# Patient Record
Sex: FEMALE | Race: WHITE | URBAN - METROPOLITAN AREA
[De-identification: names, ages, dates, MRNs, and addresses within clinical notes are randomized per-mention and may not be internally consistent; named-entity substitution may affect disease eponyms.]

---

## 2017-01-02 DIAGNOSIS — Z98.1 S/P SPINAL FUSION: Primary | ICD-10-CM

## 2017-01-04 ENCOUNTER — OFFICE VISIT (OUTPATIENT)
Dept: ORTHOPEDICS | Facility: CLINIC | Age: 67
End: 2017-01-04

## 2017-01-04 VITALS — WEIGHT: 241 LBS | BODY MASS INDEX: 40.15 KG/M2 | HEIGHT: 65 IN

## 2017-01-04 DIAGNOSIS — M41.9 SCOLIOSIS OF THORACOLUMBAR SPINE, UNSPECIFIED SCOLIOSIS TYPE: ICD-10-CM

## 2017-01-04 DIAGNOSIS — Z98.1 S/P SPINAL FUSION: Primary | ICD-10-CM

## 2017-01-04 DIAGNOSIS — M99.22: ICD-10-CM

## 2017-01-04 ASSESSMENT — ENCOUNTER SYMPTOMS
BACK PAIN: 1
MYALGIAS: 0
JOINT SWELLING: 0
MUSCLE WEAKNESS: 0
NECK PAIN: 1
STIFFNESS: 0
ARTHRALGIAS: 0
MUSCLE CRAMPS: 0

## 2017-01-04 NOTE — Clinical Note
1/4/2017       RE: Julisa Champion  [address] [id number]     Dear Colleague,    Thank you for referring your patient, Julisa Champion, to the [facility] at [facility]. Please see a copy of my visit note below.        HISTORY OF PRESENT ILLNESS:  She returns now after a posterior spinal fusion for correction for scoliosis.  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  When I saw this radiographically at her previous followup visit, I told her I thought this needs to be fixed.  However, she did not want it fixed at that time.  I said okay, that I would not force her to do it, but that when she was unable to tolerate the pain anymore she should call Loraine and come back and we should plan on fixing it.  She called last week and said that she could not tolerate the pain anymore.  She is describing right upper extremity shooting pain whenever she does something working in front of her, even including feeding herself, and she says she is unable to tolerate this pain anymore.  New radiographs are obtained today which include AP and lateral scoli films and a lateral cervical-thoracic film.  These are compared to the prior radiographs and show no change in the position, but there is about a 50% subluxation of T1 on T2 with marked angular deformity.  Her kyphotic alignment between T1 and T2 is approximately 49 degrees, normal should be perhaps 0 or 5.  Neurologic exam shows no evidence of myelopathy at this point; however, she does have exquisite pain at this level exacerbated by activities, especially using her right arm.      ASSESSMENT:  Proximal junctional failure with T1 on T2 subluxation.      PLAN:  My recommendation is that we go in and take out the T1-T2 disk, reduce T1 on T2.  We may need to extend the instrumentation up to C7, but potentially we may be able to get it with just T1 instrumentation, then  taking out the disk, placing interbody devices and doing axial anna-to-anna connectors to this.  I would consider getting prepositioning baselines although I am not sure that would profoundly change what we would need to do and that we will need to do intraoperative spinal cord monitoring.  I would like to have Cell Saver and there is some urgency to getting this done.  I will ask one of my colleagues, such as either Yoni Short or Zulay Morocho, to give me a hand with this surgery.  The patient and her  are both in agreement with this plan.  We will go ahead and get a stat MRI today with artifact reduction to look at what the anatomy is going to give this, but it is pretty clear that we are going to need to move on this in the relatively near future.  She is in agreement with that plan.       Again, thank you for allowing me to participate in the care of your patient.      Sincerely,    Isauro Wolfe MD

## 2017-01-04 NOTE — PROGRESS NOTES
Answers for HPI/ROS submitted by the patient on 1/4/2017   General Symptoms: No  Skin Symptoms: No  HENT Symptoms: No  EYE SYMPTOMS: No  HEART SYMPTOMS: No  LUNG SYMPTOMS: No  INTESTINAL SYMPTOMS: No  URINARY SYMPTOMS: No  GYNECOLOGIC SYMPTOMS: No  BREAST SYMPTOMS: No  SKELETAL SYMPTOMS: Yes  BLOOD SYMPTOMS: No  NERVOUS SYSTEM SYMPTOMS: No  MENTAL HEALTH SYMPTOMS: No  Back pain: Yes  Muscle aches: No  Neck pain: Yes  Swollen joints: No  Joint pain: No  Bone pain: No  Muscle cramps: No  Muscle weakness: No  Joint stiffness: No  Bone fracture: No    HISTORY OF PRESENT ILLNESS:  She returns now after a posterior spinal fusion for correction for scoliosis.  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  When I saw this radiographically at her previous followup visit, I told her I thought this needs to be fixed.  However, she did not want it fixed at that time.  I said okay, that I would not force her to do it, but that when she was unable to tolerate the pain anymore she should call Loraine and come back and we should plan on fixing it.  She called last week and said that she could not tolerate the pain anymore.  She is describing right upper extremity shooting pain whenever she does something working in front of her, even including feeding herself, and she says she is unable to tolerate this pain anymore.  New radiographs are obtained today which include AP and lateral scoli films and a lateral cervical-thoracic film.  These are compared to the prior radiographs and show no change in the position, but there is about a 50% subluxation of T1 on T2 with marked angular deformity.  Her kyphotic alignment between T1 and T2 is approximately 49 degrees, normal should be perhaps 0 or 5.  Neurologic exam shows no evidence of myelopathy at this point; however, she does have exquisite pain at this level exacerbated by activities, especially using her right arm.      ASSESSMENT:  Proximal  junctional failure with T1 on T2 subluxation.      PLAN:  My recommendation is that we go in and take out the T1-T2 disk, reduce T1 on T2.  We may need to extend the instrumentation up to C7, but potentially we may be able to get it with just T1 instrumentation, then taking out the disk, placing interbody devices and doing axial anna-to-anna connectors to this.  I would consider getting prepositioning baselines although I am not sure that would profoundly change what we would need to do and that we will need to do intraoperative spinal cord monitoring.  I would like to have Cell Saver and there is some urgency to getting this done.  I will ask one of my colleagues, such as either Yoni Short or Zulay Morocho, to give me a hand with this surgery.  The patient and her  are both in agreement with this plan.  We will go ahead and get a stat MRI today with artifact reduction to look at what the anatomy is going to give this, but it is pretty clear that we are going to need to move on this in the relatively near future.  She is in agreement with that plan.

## 2017-01-04 NOTE — NURSING NOTE
"Reason For Visit:   Chief Complaint   Patient presents with     Surgical Followup     s/p spinal fusion 8/15/16       Primary MD: Dr. Jarrett Sanchez  Ref. MD: Dr. Laura Agee      Occupation retired RN.  Date of injury: none    Date of surgery: 8/15/16  Type of surgery: PROCEDURES:    1.  Posterior spinal fusion T2-S1.    2.  Pelvic fixation.    3.  Laminectomy L2, L3, L4.    4.  Bautista-Sanchez osteotomies L2-L3, L3-L4, L4-L5.    5.  Facet osteotomies T12-L1 and L1-L2.    5.  Transforaminal lumbar interbody fusion L4-L5, L3-L4.    6.  Insertion structural intervertebral device, L3-L4, L4-L5.    7.  Image-guided surgery.    8.  Dural repair.    9.  Injection of intrathecal substance.  .  Smoker: No      Ht 1.66 m (5' 5.35\")  Wt 109.317 kg (241 lb)  BMI 39.67 kg/m2    Pain Assessment  Patient Currently in Pain: Yes  0-10 Pain Scale: 8  Primary Pain Location: Back  Pain Orientation: Upper  Pain Descriptors: Stabbing, Burning  Alleviating Factors: Other (comment), Rest (avoiding use)  Aggravating Factors: Other (comment) (use of arms, jarring motion (bumpy car ride))    Oswestry (SILVA) Questionnaire    OSWESTRY DISABILITY INDEX 1/4/2017   SECTION 1-PAIN INTENSITY 2  The pain is moderate at the moment.   SECTION 2-PERSONAL CARE (WASHING,DRESSING,ETC.) 3  I need some help but manage most of my personal care.   SECTION 3-LIFTING 4  I can only lift very light weights.   SECTION 4-WALKING 2  Pain prevents me from walking more than a quarter of a mile.   SECTION 5-SITTING 1  I can sit in my favorite chair as long as I like   SECTION 6-STANDING 3  Pain prevents me from standing for more than half an hour.   SECTION 7-SLEEPING 2  Because of pain I have less than 6 hours sleep.   SECTION 8-SEX LIFE (IF APPLICABLE) Does not apply / No answer   SECTION 9-SOCIAL LIFE 2  Pain has no significant effect on my social life apart from limiting my more energetic interests e.g. sport, etc.   SECTION 10-TRAVELING 2  Pain is bad but I " am able to manage trips over two hours.   Oswestry Disability Index: Count 9   SILVA: Total Score = SUM (total for answered questions) 21   Computed Oswestry Score 46.66 (%)                      Numeric Rating Scale:  VAS Scores     VAS Survey 1/4/2017   What is your level of back pain during the last week: 8.0   What is your level of RIGHT leg pain during the last week: 1.0   What is your level of LEFT leg pain during the last week: 1.0   What is your level of neck pain during the last week: 9.0   What is your level of RIGHT arm pain during the last week: 0   What is your level of LEFT arm pain during the last week: 1.0                Promis 10 Assessment    PROMIS 10 1/4/2017   In general, would you say your health is: Very Good = 4   In general, would you say your quality of life is: Very good = 4   In general, how would you rate your physical health? Good = 3   In general, how would you rate your mental health, including your mood and your ability to think? Good = 3   In general, how would you rate your satisfaction with your social activities and relationships? Very good = 4   In general, please rate how well you carry out your usual social activities and roles Good = 3   To what extent are you able to carry out your everyday physical activities such as walking, climbing stairs, carrying groceries, or moving a chair? Moderately = 3   How often have you been bothered by emotional problems such as feeling anxious, depressed or irritable? Rarely = 4   How would you rate your fatigue on average? Mild = 4   How would you rate your pain on average?   0 = No Pain  to  10 = Worst Imaginable Pain 8   Global Physical Health Score : Raw Score 12 (SUM : G03 - G06 - G07 - G08)   Global Mentall Health Score : Raw Score 15 (SUM : G02 - G04 - G05 - G10)   Total (Physical + Mental Health Score) 27

## 2017-01-05 ENCOUNTER — TELEPHONE (OUTPATIENT)
Dept: ORTHOPEDICS | Facility: CLINIC | Age: 67
End: 2017-01-05

## 2017-01-05 NOTE — TELEPHONE ENCOUNTER
reviewed MRI Spine  Done yesterday after clinic appt. Which confirmed what he needed for surgery planning levels Extension of Fusion for Subluxation T1-2.  In [facility] approved & surgery date set  For Feb 6.   reviewed with .   stated pt. Should go to  [facility]. Immediately if develops  Any changes in her Gait or incontinence of bowel or bladder.   I called pt. With results &  ER instructions.   Pt. Stated understanding.  Call back prn.  V.O.R.B./Loraine Lizarraga RN.

## 2017-01-13 ENCOUNTER — ANESTHESIA EVENT (OUTPATIENT)
Dept: SURGERY | Facility: CLINIC | Age: 67
DRG: 457 | End: 2017-01-13
Payer: MEDICARE

## 2017-01-13 ENCOUNTER — ALLIED HEALTH/NURSE VISIT (OUTPATIENT)
Dept: SURGERY | Facility: CLINIC | Age: 67
End: 2017-01-13

## 2017-01-13 ENCOUNTER — OFFICE VISIT (OUTPATIENT)
Dept: SURGERY | Facility: CLINIC | Age: 67
End: 2017-01-13

## 2017-01-13 VITALS
OXYGEN SATURATION: 96 % | RESPIRATION RATE: 16 BRPM | HEART RATE: 86 BPM | WEIGHT: 238.6 LBS | SYSTOLIC BLOOD PRESSURE: 122 MMHG | BODY MASS INDEX: 39.75 KG/M2 | DIASTOLIC BLOOD PRESSURE: 74 MMHG | HEIGHT: 65 IN | TEMPERATURE: 98.7 F

## 2017-01-13 DIAGNOSIS — Z01.818 PREOP EXAMINATION: ICD-10-CM

## 2017-01-13 DIAGNOSIS — Z01.818 PREOP EXAMINATION: Primary | ICD-10-CM

## 2017-01-13 LAB
ALBUMIN UR-MCNC: NEGATIVE MG/DL
ANION GAP SERPL CALCULATED.3IONS-SCNC: 7 MMOL/L (ref 3–14)
APPEARANCE UR: ABNORMAL
APTT PPP: 28 SEC (ref 22–37)
BASOPHILS # BLD AUTO: 0 10E9/L (ref 0–0.2)
BASOPHILS NFR BLD AUTO: 0.5 %
BILIRUB UR QL STRIP: NEGATIVE
BUN SERPL-MCNC: 16 MG/DL (ref 7–30)
CALCIUM SERPL-MCNC: 8.8 MG/DL (ref 8.5–10.1)
CHLORIDE SERPL-SCNC: 105 MMOL/L (ref 94–109)
CO2 SERPL-SCNC: 28 MMOL/L (ref 20–32)
COLOR UR AUTO: YELLOW
CREAT SERPL-MCNC: 0.8 MG/DL (ref 0.52–1.04)
DIFFERENTIAL METHOD BLD: ABNORMAL
EOSINOPHIL # BLD AUTO: 0.4 10E9/L (ref 0–0.7)
EOSINOPHIL NFR BLD AUTO: 6.8 %
ERYTHROCYTE [DISTWIDTH] IN BLOOD BY AUTOMATED COUNT: 13.1 % (ref 10–15)
GFR SERPL CREATININE-BSD FRML MDRD: 72 ML/MIN/1.7M2
GLUCOSE SERPL-MCNC: 80 MG/DL (ref 70–99)
GLUCOSE UR STRIP-MCNC: NEGATIVE MG/DL
HCT VFR BLD AUTO: 37.5 % (ref 35–47)
HGB BLD-MCNC: 11.6 G/DL (ref 11.7–15.7)
HGB UR QL STRIP: ABNORMAL
HYALINE CASTS #/AREA URNS LPF: 1 /LPF (ref 0–2)
IMM GRANULOCYTES # BLD: 0 10E9/L (ref 0–0.4)
IMM GRANULOCYTES NFR BLD: 0.2 %
INR PPP: 1.02 (ref 0.86–1.14)
KETONES UR STRIP-MCNC: NEGATIVE MG/DL
LEUKOCYTE ESTERASE UR QL STRIP: ABNORMAL
LYMPHOCYTES # BLD AUTO: 1.7 10E9/L (ref 0.8–5.3)
LYMPHOCYTES NFR BLD AUTO: 30.1 %
MCH RBC QN AUTO: 28.9 PG (ref 26.5–33)
MCHC RBC AUTO-ENTMCNC: 30.9 G/DL (ref 31.5–36.5)
MCV RBC AUTO: 93 FL (ref 78–100)
MONOCYTES # BLD AUTO: 0.4 10E9/L (ref 0–1.3)
MONOCYTES NFR BLD AUTO: 6.5 %
MUCOUS THREADS #/AREA URNS LPF: PRESENT /LPF
NEUTROPHILS # BLD AUTO: 3.2 10E9/L (ref 1.6–8.3)
NEUTROPHILS NFR BLD AUTO: 55.9 %
NITRATE UR QL: NEGATIVE
NRBC # BLD AUTO: 0 10*3/UL
NRBC BLD AUTO-RTO: 0 /100
PH UR STRIP: 5 PH (ref 5–7)
PLATELET # BLD AUTO: 254 10E9/L (ref 150–450)
POTASSIUM SERPL-SCNC: 4.2 MMOL/L (ref 3.4–5.3)
RBC # BLD AUTO: 4.02 10E12/L (ref 3.8–5.2)
RBC #/AREA URNS AUTO: 16 /HPF (ref 0–2)
SODIUM SERPL-SCNC: 140 MMOL/L (ref 133–144)
SP GR UR STRIP: 1.02 (ref 1–1.03)
SQUAMOUS #/AREA URNS AUTO: 7 /HPF (ref 0–1)
URN SPEC COLLECT METH UR: ABNORMAL
UROBILINOGEN UR STRIP-MCNC: 0 MG/DL (ref 0–2)
WBC # BLD AUTO: 5.7 10E9/L (ref 4–11)
WBC #/AREA URNS AUTO: 10 /HPF (ref 0–2)

## 2017-01-13 PROCEDURE — 86870 RBC ANTIBODY IDENTIFICATION: CPT | Performed by: NURSE PRACTITIONER

## 2017-01-13 PROCEDURE — 87086 URINE CULTURE/COLONY COUNT: CPT | Performed by: NURSE PRACTITIONER

## 2017-01-13 NOTE — MR AVS SNAPSHOT
After Visit Summary   2017    Julisa Champion    MRN: [id number]           Patient Information     Date Of Birth          1950        Visit Information        Provider Department      2017 12:30 PM Rn, [facility]        Care Instructions    Preparing for Your Surgery      Name:  Julisa Champion   MRN:  [id number]   :  1950   Today's Date:  2017     Arriving for surgery:  Surgery date:  17  Surgery time:  1:00 PM  Arrival time:  10:30 AM  Please come to:     [facility] [address]  [address] [place]  [address]    - parking is available in front of [facility] from 5:15AM to 8:00PM. If you prefer, park your car in the Green Lot.    -Proceed to the 3rd floor, check in at the Adult Surgery Waiting Lounge. [phone]    If an escort is needed stop at the Information Desk in the lobby. Inform the information person that you are here for surgery. An escort to the Adult Surgery Waiting Lounge will be provided.        What can I eat or drink?  -  You may have solid food or milk products until 8 hours prior to your surgery.  -  You may have water, apple juice or 7up/Sprite until 2 hours prior to your surgery.    Which medicines can I take?  -  Do NOT take these medications in the morning, the day of surgery: Lisinopril-Hydrochlorothiazide      -  Please take these medications the day of surgery: Tylenol orTramadol if needed    How do I prepare myself?  -  Take two showers: one the night before surgery; and one the morning of surgery.         Use Scrubcare or Hibiclens to wash from neck down.  You may use your own shampoo and conditioner. No other hair products.   -  Do NOT use lotion, powder, deodorant, or antiperspirant the day of your surgery.  -  Do NOT wear any makeup, fingernail polish or jewelry.  -  Begin using Incentive Spirometer 1 week prior to surgery.  Use 4 times  per day, up to 5-10 breaths each time.  Bring Incentive Spirometer to hospital.  -Do not bring your own medications to the hospital, except for inhalers and eye drops.  -  Bring your ID and insurance card.    Questions or Concerns:  If you have questions or concerns, please call the  Preoperative Assessment Center, Monday-Friday 7AM-7PM:  [phone]            AFTER YOUR SURGERY  Breathing exercises   Breathing exercises help you recover faster. Take deep breaths and let the air out slowly. This will:     Help you wake up after surgery.    Help prevent complications like pneumonia.  Preventing complications will help you go home sooner.   We may give you a breathing device (incentive spirometer) to encourage you to breathe deeply.   Nausea and vomiting   You may feel sick to your stomach after surgery; if so, let your nurse know.    Pain control:  After surgery, you may have pain. Our goal is to help you manage your pain. Pain medicine will help you feel comfortable enough to do activities that will help you heal.  These activities may include breathing exercises, walking and physical therapy.   To help your health care team treat your pain we will ask: 1) If you have pain  2) where it is located 3) describe your pain in your words  Methods of pain control include medications given by mouth, vein or by nerve block for some surgeries.  We may give you a pain control pump that will:  1) Deliver the medicine through a tube placed in your vein  2) Control the amount of medicine you receive  3) Allow you to push a button to deliver a dose of pain medicine  Sequential Compression Device (SCD) or Pneumo Boots:  You may need to wear SCD S on your legs or feet. These are wraps connected to a machine that pumps in air and releases it. The repeated pumping helps prevent blood clots from forming.       Using an Incentive Spirometer  Soon after your surgery, a nurse or therapist will teach you breathing exercises. These keep  your lungs clear, strengthen your breathing muscles, and help prevent complications.  The exercises include doing a deep-breathing exercise using a device called an incentive spirometer.  To do these exercises, you will breathe in through your mouth and not your nose. The incentive spirometer only works correctly if you breathe in through your mouth.  Four steps to clear lungs     Deep breathing expands the lungs, aids circulation, and helps prevent pneumonia.   1. Exhale normally.    Relax and breathe out.  2. Place your lips tightly around the mouthpiece.    Make sure the device is upright and not tilted.  3. Inhale as much air as you can through the mouthpiece (don't breath through your nose).    Inhale slowly and deeply.    Hold your breath long enough to keep the balls or disk raised for at least 3 seconds.    If you re inhaling too quickly, your device may make a tone. If you hear this tone, inhale more slowly.  4. Repeat the exercise regularly.    Do this exercise every hour while you're awake, or as your health care provider instructs.    You will also be taught coughing exercises and be asked to do them regularly on your own.    0296-8232 [employer]. [address] [place] [address]. All rights reserved. This information is not intended as a substitute for professional medical care. Always follow your healthcare professional's instructions.              Follow-ups after your visit        Your next 10 appointments already scheduled     Jan 13, 2017  1:00 PM   (Arrive by 12:45 PM)   PAC EVALUATION with  Pac Kathryn 8   [facility] ([facility])    [address] [place] [id number] [phone]            Jan 13, 2017  2:10 PM   (Arrive by 1:55 PM)   PAC Anesthesia Consult with Olivia Pac Anesthesiologist   [facility] ([facility])    [address] [place] [address]  [facility] [id number] [phone]            Jan 13, 2017  2:30 PM   LAB with  LAB   [facility] ([facility])    [address] [place] [id number] [phone]           Patient must bring picture ID.  Patient should be prepared to give a urine specimen  Please do not eat 10-12 hours before your appointment if you are coming in fasting for labs on lipids, cholesterol, or glucose (sugar).  Pregnant women should follow their Care Team instructions. Water with medications is okay. Do not drink coffee or other fluids.   If you have concerns about taking  your medications, please ask at office or if scheduling via [employer], send a message by clicking on Secure Messaging, Message Your Care Team.            Feb 06, 2017   Procedure with Isauro Wolfe MD   [facility] [place], Same Day Surgery (--)    [address] [facility] [address]   [phone]            Mar 23, 2017 11:45 AM   (Arrive by 11:15 AM)   RETURN SPINE with Isauro Wolfe MD   [facility] ([facility])    [address] [place] [address]  [address] [facility] [address] [phone]            Apr 20, 2017 10:30 AM   (Arrive by 10:00 AM)   RETURN SPINE with Isauro Wolfe MD   [facility] ([facility])    [address] [place] [id number] [phone]              Who to contact     Please call your clinic at [phone] to:    Ask questions about your health    Make or cancel appointments    Discuss your medicines    Learn about your test results    Speak to your doctor   If you have compliments or concerns about an experience at your clinic, or if you wish to file a complaint, please contact [facility] Patient Relations at [phone] or email us at [email or web address]         Additional Information About Your Visit        [employer]  Information     [employer]ps gives you secure access to your electronic health record. If you see a primary care provider, you can also send messages to your care team and make appointments. If you have questions, please call your primary care clinic.  If you do not have a primary care provider, please call [phone] and they will assist you.      Edtrips is an electronic gateway that provides easy, online access to your medical records. With Ed[employer]ps, you can request a clinic appointment, read your test results, renew a prescription or communicate with your care team.     To access your existing account, please contact your [facility] or call [phone] for assistance.        Care EveryWhere ID     This is your Care EveryWhere ID. This could be used by other organizations to access your [place] medical records  [id number]         Blood Pressure from Last 3 Encounters:   08/27/16 142/69   06/29/16 122/81   06/17/16 99/62    Weight from Last 3 Encounters:   01/04/17 109.317 kg (241 lb)   11/03/16 108.41 kg (239 lb)   08/15/16 100 kg (220 lb 7.4 oz)              Today, you had the following     No orders found for display       Primary Care Provider    Md Other Clinic                Thank you!     Thank you for choosing [facility] [place] [facility]  for your care. Our goal is always to provide you with excellent care. Hearing back from our patients is one way we can continue to improve our services. Please take a few minutes to complete the written survey that you may receive in the mail after your visit with us. Thank you!             Your Updated Medication List - Protect others around you: Learn how to safely use, store and throw away your medicines at www.disposemymeds.org.          This list is accurate as of: 1/13/17 12:38 PM.  Always use your most recent med list.                   Brand Name Dispense Instructions for use    acetaminophen 500 MG tablet    TYLENOL      Take 2 tablets (1,000 mg) by mouth every 6 hours as needed for mild pain or fever       EFFEXOR PO      Take 225 mg by mouth every evening       lisinopril-hydrochlorothiazide 20-12.5 MG per tablet    PRINZIDE/ZESTORETIC    30 tablet    Take 1 tablet by mouth daily       methocarbamol 750 MG tablet    ROBAXIN    60 tablet    Take 1 tablet (750 mg) by mouth 4 times daily as needed for muscle spasms       nortriptyline 10 MG capsule    PAMELOR    30 capsule    Take 1 capsule (10 mg) by mouth At Bedtime       order for DME     1 each    Equipment being ordered: Walker Wheels () and Walker () Treatment Diagnosis: Gait instability       polyethylene glycol Packet    MIRALAX/GLYCOLAX    30 packet    Take 17 g by mouth daily as needed for constipation       senna-docusate 8.6-50 MG per tablet    SENOKOT-S;PERICOLACE    100 tablet    Take 1-2 tablets by mouth 2 times daily as needed for constipation       traMADol 50 MG tablet    ULTRAM    120 tablet    Take 1 tablet (50 mg) by mouth every 6 hours as needed for moderate pain

## 2017-01-13 NOTE — PATIENT INSTRUCTIONS
Preparing for Your Surgery      Name:  Julisa Champion   MRN:  [id number]   :  1950   Today's Date:  2017     Arriving for surgery:  Surgery date:  17  Surgery time:  1:00 PM  Arrival time:  10:30 AM  Please come to:     [facility] [address]  [address] [place]  [address]    - parking is available in front of [facility] from 5:15AM to 8:00PM. If you prefer, park your car in the Green Lot.    -Proceed to the 3rd floor, check in at the Adult Surgery Waiting Lounge. [phone]    If an escort is needed stop at the Information Desk in the lobby. Inform the information person that you are here for surgery. An escort to the Adult Surgery Waiting Lounge will be provided.        What can I eat or drink?  -  You may have solid food or milk products until 8 hours prior to your surgery.  -  You may have water, apple juice or 7up/Sprite until 2 hours prior to your surgery.    Which medicines can I take?  -  Do NOT take these medications in the morning, the day of surgery: Lisinopril-Hydrochlorothiazide      -  Please take these medications the day of surgery: Tylenol orTramadol if needed    How do I prepare myself?  -  Take two showers: one the night before surgery; and one the morning of surgery.         Use Scrubcare or Hibiclens to wash from neck down.  You may use your own shampoo and conditioner. No other hair products.   -  Do NOT use lotion, powder, deodorant, or antiperspirant the day of your surgery.  -  Do NOT wear any makeup, fingernail polish or jewelry.  -  Begin using Incentive Spirometer 1 week prior to surgery.  Use 4 times per day, up to 5-10 breaths each time.  Bring Incentive Spirometer to hospital.  -Do not bring your own medications to the hospital, except for inhalers and eye drops.  -  Bring your ID and insurance card.    Questions or Concerns:  If you have questions or concerns, please call the  Preoperative Assessment  [place], Monday-Friday 7AM-7PM:  [phone]            AFTER YOUR SURGERY  Breathing exercises   Breathing exercises help you recover faster. Take deep breaths and let the air out slowly. This will:     Help you wake up after surgery.    Help prevent complications like pneumonia.  Preventing complications will help you go home sooner.   We may give you a breathing device (incentive spirometer) to encourage you to breathe deeply.   Nausea and vomiting   You may feel sick to your stomach after surgery; if so, let your nurse know.    Pain control:  After surgery, you may have pain. Our goal is to help you manage your pain. Pain medicine will help you feel comfortable enough to do activities that will help you heal.  These activities may include breathing exercises, walking and physical therapy.   To help your health care team treat your pain we will ask: 1) If you have pain  2) where it is located 3) describe your pain in your words  Methods of pain control include medications given by mouth, vein or by nerve block for some surgeries.  We may give you a pain control pump that will:  1) Deliver the medicine through a tube placed in your vein  2) Control the amount of medicine you receive  3) Allow you to push a button to deliver a dose of pain medicine  Sequential Compression Device (SCD) or Pneumo Boots:  You may need to wear SCD S on your legs or feet. These are wraps connected to a machine that pumps in air and releases it. The repeated pumping helps prevent blood clots from forming.       Using an Incentive Spirometer  Soon after your surgery, a nurse or therapist will teach you breathing exercises. These keep your lungs clear, strengthen your breathing muscles, and help prevent complications.  The exercises include doing a deep-breathing exercise using a device called an incentive spirometer.  To do these exercises, you will breathe in through your mouth and not your nose. The incentive spirometer only works  correctly if you breathe in through your mouth.  Four steps to clear lungs     Deep breathing expands the lungs, aids circulation, and helps prevent pneumonia.   1. Exhale normally.    Relax and breathe out.  2. Place your lips tightly around the mouthpiece.    Make sure the device is upright and not tilted.  3. Inhale as much air as you can through the mouthpiece (don't breath through your nose).    Inhale slowly and deeply.    Hold your breath long enough to keep the balls or disk raised for at least 3 seconds.    If you re inhaling too quickly, your device may make a tone. If you hear this tone, inhale more slowly.  4. Repeat the exercise regularly.    Do this exercise every hour while you're awake, or as your health care provider instructs.    You will also be taught coughing exercises and be asked to do them regularly on your own.    [id number] [employer]. [address] [place] [address], [place] [address]. All rights reserved. This information is not intended as a substitute for professional medical care. Always follow your healthcare professional's instructions.

## 2017-01-13 NOTE — H&P
Pre-Operative H & P     CC:  Preoperative exam to assess for increased cardiopulmonary risk while undergoing surgery and anesthesia.    Date of Encounter: 1/13/2017  Primary Care Physician:  Md JEIMY Mariano  Julisa Champion is a 66 year old female who presents for pre-operative H & P in preparation for optical tracking system fusion posterior cervical two levels with Cindy Wolfe and Rashawn on 2/6/2016 at [facility].         Julisa Champion is a 66 year old female with hypertension, obesity, restless leg syndrome, constipation, upper back pain, and anxiety that has been having a complication of upper back pain since having a spinal fusion procedure on 8/15/2016 for scoliosis.  It was determined that she now has T1 and T2 subluxations and the above listed procedure has been recommended by Dr. Wolfe for treatment.        History is obtained from the patient.     Past Medical History  Past Medical History   Diagnosis Date     Hypertension      History of spinal fusion      for scoliosis     History of blood transfusion      Constipation      Back pain      Subluxation of thoracic vertebra      Obesity      Anxiety      Restless leg syndrome        Past Surgical History  Past Surgical History   Procedure Laterality Date     Gastric bypass  2002     Orthopedic surgery       bilateral knee replacements     Hysterectomy       [facility] BSO     Optical tracking system fusion posterior spine thoracic three+ levels N/A 8/15/2016     Procedure: OPTICAL TRACKING SYSTEM FUSION POSTERIOR SPINE THORACIC THREE+ LEVELS;  Surgeon: Isauro Wolfe MD;  Location: [facility] OR       Hx of Blood transfusions/reactions: +hx blood transfusion, no reactions    Hx of abnormal bleeding or anti-platelet use: none    Menstrual history: No LMP recorded. Patient has had a hysterectomy.    Steroid use in the last year: none    Personal or FH with difficulty with Anesthesia:  See note in plan regarding  post-op delirium.  No know family history of anesthesia complications.    Prior to Admission Medications  Current Outpatient Prescriptions   Medication Sig Dispense Refill     traMADol (ULTRAM) 50 MG tablet Take 1 tablet (50 mg) by mouth every 6 hours as needed for moderate pain 120 tablet 1     nortriptyline (PAMELOR) 10 MG capsule Take 1 capsule (10 mg) by mouth At Bedtime 30 capsule 0     lisinopril-hydrochlorothiazide (PRINZIDE,ZESTORETIC) 20-12.5 MG per tablet Take 1 tablet by mouth daily 30 tablet      polyethylene glycol (MIRALAX/GLYCOLAX) packet Take 17 g by mouth daily as needed for constipation 30 packet 1     senna-docusate (SENOKOT-S;PERICOLACE) 8.6-50 MG per tablet Take 1-2 tablets by mouth 2 times daily as needed for constipation 100 tablet 1     methocarbamol (ROBAXIN) 750 MG tablet Take 1 tablet (750 mg) by mouth 4 times daily as needed for muscle spasms 60 tablet 0     order for DME Equipment being ordered: Walker Wheels () and Walker ()  Treatment Diagnosis: Gait instability 1 each 0     acetaminophen (TYLENOL) 500 MG tablet Take 2 tablets (1,000 mg) by mouth every 6 hours as needed for mild pain or fever       Venlafaxine HCl (EFFEXOR PO) Take 225 mg by mouth every evening          Allergies  No Known Allergies    Social History  Social History     Social History     Marital Status:      Spouse Name: Christian     Number of Children: 3     Years of Education: N/A     Occupational History     retired RN      Social History Main Topics     Smoking status: Never Smoker      Smokeless tobacco: Not on file     Alcohol Use: 0.0 oz/week     0 Standard drinks or equivalent per week      Comment: one cocktail a month when out for dinner     Drug Use: No     Sexual Activity: Not on file     Other Topics Concern     Caffeine Concern Yes     1 can of soda a day      Special Diet No     Exercise No     not much due to pain     Social History Narrative       Family History  Family History   Problem  "Relation Age of Onset     Colon Cancer Father      Other Cancer Mother      unsure of primary, but mets to kidney and bone     Myocardial Infarction Father      Coronary Artery Disease Father      Chromosome Abnormality Daughter      trisomy 18         ROS/MED HX  The complete review of systems is negative other than noted in the HPI or here.     ENT/Pulmonary:     (+)CT risk factors snores loudly, obese, , . .    Neurologic:     (+)other neuro restless leg syndrome    Cardiovascular:     (+) hypertension-range: unknown  METS/Exercise Tolerance:  >4 METS   Hematologic:     (+) History of Transfusion no previous transfusion reaction -     (-) history of blood clots   Musculoskeletal: Comment: T1 and T2 subluxation,  Upper back pain        GI/Hepatic:     (+) Other GI/Hepatic constipation      Renal/Genitourinary:  - ROS Renal section negative       Endo:  - neg endo ROS   (+) Obesity      Psychiatric:     (+) psychiatric history anxiety      Infectious Disease:  - neg infectious disease ROS       Malignancy:      - no malignancy   Other:    (+) H/O Chronic Pain,               Temp: 98.7  F (37.1  C) Temp src: Oral BP: 122/74 mmHg Pulse: 86   Resp: 16 SpO2: 96 %         238 lbs 9.6 oz  5' 5\"   Body mass index is 39.71 kg/(m^2).       Physical Exam  Constitutional: Awake, alert, cooperative, no apparent distress, and appears stated age. obese  Eyes: Pupils equal, round and reactive to light, extra ocular muscles intact, sclera clear, conjunctiva normal.  HENT: Normocephalic, oral pharynx with moist mucus membranes, good dentition. No goiter appreciated.   Respiratory: Clear to auscultation bilaterally, no crackles or wheezing.  Cardiovascular: Regular rate and rhythm, normal S1 and S2, and no murmur noted.  Carotids +2, no bruits. No edema. Palpable pulses to radial  DP and PT arteries.   GI: Normal bowel sounds, soft, non-distended, non-tender, no masses palpated, no hepatosplenomegaly.  Surgical scars: midline " surgical scar  Lymph/Hematologic: No cervical lymphadenopathy and no supraclavicular lymphadenopathy.  Genitourinary:  deferred  Skin: Warm and dry.  No rashes at anticipated surgical site.  Midline thoracic surgical scarring.  Musculoskeletal: Slightly limited ROM of neck. There is no redness, warmth, or swelling of the exposed joints. Gross motor strength is normal.    Neurologic: Awake, alert, oriented to name, place and time. Cranial nerves II-XII are grossly intact. Gait is normal.   Neuropsychiatric: Calm, cooperative. Normal affect.     Labs: (personally reviewed)  Component      Latest Ref Rng 1/13/2017   WBC      4.0 - 11.0 10e9/L 5.7   RBC Count      3.8 - 5.2 10e12/L 4.02   Hemoglobin      11.7 - 15.7 g/dL 11.6 (L)   Hematocrit      35.0 - 47.0 % 37.5   MCV      78 - 100 fl 93   MCH      26.5 - 33.0 pg 28.9   MCHC      31.5 - 36.5 g/dL 30.9 (L)   RDW      10.0 - 15.0 % 13.1   Platelet Count      150 - 450 10e9/L 254   Diff Method       Automated Method   % Neutrophils       55.9   % Lymphocytes       30.1   % Monocytes       6.5   % Eosinophils       6.8   % Basophils       0.5   % Immature Granulocytes       0.2   Nucleated RBCs      0 /100 0   Absolute Neutrophil      1.6 - 8.3 10e9/L 3.2   Absolute Lymphocytes      0.8 - 5.3 10e9/L 1.7   Absolute Monocytes      0.0 - 1.3 10e9/L 0.4   Absolute Eosinophils      0.0 - 0.7 10e9/L 0.4   Absolute Basophils      0.0 - 0.2 10e9/L 0.0   Abs Immature Granulocytes      0 - 0.4 10e9/L 0.0   Absolute Nucleated RBC       0.0   Sodium      133 - 144 mmol/L 140   Potassium      3.4 - 5.3 mmol/L 4.2   Chloride      94 - 109 mmol/L 105   Carbon Dioxide      20 - 32 mmol/L 28   Anion Gap      3 - 14 mmol/L 7   Glucose      70 - 99 mg/dL 80   Urea Nitrogen      7 - 30 mg/dL 16   Creatinine      0.52 - 1.04 mg/dL 0.80   GFR Estimate      >60 mL/min/1.7m2 72   GFR Estimate If Black      >60 mL/min/1.7m2 87   Calcium      8.5 - 10.1 mg/dL 8.8   PTT      22 - 37 sec 28    INR      0.86 - 1.14 1.02       EK2016  NSR  Cardiac echo: 2016  Interpretation Summary  No significant valvular abnormalities were noted. No pathologic basis for  murmur identified. Technically difficult study.Extremely poor acoustic  windows.  Global and regional left ventricular function is hyperkinetic with an EF of  65-70%.  Right ventricular function, chamber size, wall motion, and thickness are  normal.        ASSESSMENT and PLAN  Julisa Champion is a 66 year old female scheduled for optical tracking system fusion posterior cervical two levels on 2017 by Tony Wolfe and Rashawn in treatment of T1 and T2 subluxation.  PAC referral for risk assessment and optimization for anesthesia with comorbid conditions of:  Hypertension, obesity, restless leg syndrome, constipation, upper back pain, and anxiety.      Pre-operative considerations:  1.  Cardiac:  Functional status- METS 4.  Her exercise tolerance has been gradually improving since her previous spinal fusion surgery.  She can now walk at least 3 blocks without having to stop to rest.  Hypertension is well controlled with lisinopril/hctz which she will hold the DOS.  Intermediate risk surgery with 0.4% risk of major adverse cardiac event. No further cardiac evaluation needed per 2014 ACC/AHA guidelines for non-cardiac surgery.  2.  Pulm:  Airway feasible.  CT risk: intermediate.    3.  GI:  Risk of PONV score = 2.  If > 2, anti-emetic intervention recommended.  Patient reports having difficulty with constipation.  Consider continuing home dosages of miralax and senna for prevention.  4. Neuro:  Patient reports significant post-op delirium, confusion, and hallucinations after her last spinal fusion surgery this past August.  She reports she doesn't even remember the first 7 days of that hospitalization.  Will be high risk for post-op delirium.  Consider avoiding benzos as able and limiting narcotics.  Patient has RLS that is controlled with  nortriptyline.  Consider continuing this medication post-op to avoid symptoms of RLS while admitted.    5.  Musculoskeletal:  Uses a walker for ambulating longer distances, but not in the home.  May want to consider walker use while hospitalized and fall risk precautions.     VTE risk:  0.5%    Patient is optimized and is acceptable candidate for the proposed procedure.  No further diagnostic evaluation is needed.     Patient also evaluated by Dr. Youssef.           Brigid White DNP, RN, APRN  Preoperative Assessment Center  [facility]  Clinic and [facility] Center  Phone: [phone]  Fax: [phone]

## 2017-01-13 NOTE — ANESTHESIA PREPROCEDURE EVALUATION
Anesthesia Evaluation     . Pt has had prior anesthetic. Type: General    History of anesthetic complications  -     ROS/MED HX    ENT/Pulmonary:     (+)CT risk factors snores loudly, obese, , . .   (-) recent URI   Neurologic:     (+)other neuro restless leg syndrome , T1 on T2  sublaxation    Cardiovascular:     (+) hypertension-range: unknown, ---. : . . . :. . Previous cardiac testing Echodate:8/26/2016results:Interpretation Summary  No significant valvular abnormalities were noted. No pathologic basis for  murmur identified. Technically difficult study.Extremely poor acoustic  windows.  Global and regional left ventricular function is hyperkinetic with an EF of  65-70%.  Right ventricular function, chamber size, wall motion, and thickness are  normal.date: results:ECG reviewed date:8/25/2016 results:NSR date: results:          METS/Exercise Tolerance:  >4 METS   Hematologic:     (+) History of Transfusion no previous transfusion reaction -     (-) history of blood clots   Musculoskeletal: Comment: T1 and T2 subluxation,  Upper back pain        GI/Hepatic:     (+) Other GI/Hepatic constipation      Renal/Genitourinary:  - ROS Renal section negative       Endo:  - neg endo ROS   (+) Obesity, .      Psychiatric:     (+) psychiatric history anxiety      Infectious Disease:  - neg infectious disease ROS       Malignancy:      - no malignancy   Other:    (+) H/O Chronic Pain,             Physical Exam  Normal systems: cardiovascular and pulmonary    Airway   Mallampati: II  TM distance: >3 FB  Neck ROM: limited    Dental   Comment: Upper and lower bridges    Cardiovascular   Rhythm and rate: regular and normal      Pulmonary    breath sounds clear to auscultation    Other findings: obese           PAC Discussion and Assessment    ASA Classification: 2  Case is [facility] for: [facility]  Anesthetic techniques and relevant risks discussed: GA  Invasive monitoring and risk discussed: No  Types:   Possibility and Risk  of blood transfusion discussed: Yes  NPO instructions given:   Additional anesthetic preparation and risks discussed:   Needs early admission to pre-op area:   Other:     PAC Resident/NP Anesthesia Assessment:  Julisa Champion is a 66 year old female scheduled for optical tracking system fusion posterior cervical two levels on 2/6/2017 by Tony Wolfe and Rashawn in treatment of T1 and T2 subluxation.  PAC referral for risk assessment and optimization for anesthesia with comorbid conditions of:  Hypertension, obesity, restless leg syndrome, constipation, upper back pain, and anxiety.      Pre-operative considerations:  1.  Cardiac:  Functional status- METS 4.  Her exercise tolerance has been gradually improving since her previous spinal fusion surgery.  She can now walk at least 3 blocks without having to stop to rest.  Hypertension is well controlled with lisinopril/hctz which she will hold the DOS.  Intermediate risk surgery with 0.4% risk of major adverse cardiac event. No further cardiac evaluation needed per 2014 ACC/AHA guidelines for non-cardiac surgery.  2.  Pulm:  Airway feasible.  CT risk: intermediate.    3.  GI:  Risk of PONV score = 2.  If > 2, anti-emetic intervention recommended.  Patient reports having difficulty with constipation.  Consider continuing home dosages of miralax and senna for prevention.  4. Neuro:  Patient reports significant post-op delirium, confusion, and hallucinations after her last spinal fusion surgery this past August.  She reports she doesn't even remember the first 7 days of that hospitalization.  Will be high risk for post-op delirium.  Consider avoiding benzos as able and limiting narcotics.  Patient has RLS that is controlled with nortriptyline.  Consider continuing this medication post-op to avoid symptoms of RLS while admitted.    5.  Musculoskeletal:  Uses a walker for ambulating longer distances, but not in the home.  May want to consider walker use while hospitalized  and fall risk precautions.       VTE risk:  0.5%    Patient is optimized and is acceptable candidate for the proposed procedure.  No further diagnostic evaluation is needed.     Patient also evaluated by Dr. Youssef. See recommendations below.     For further details of assessment, testing, and physical exam please see H and P completed on same date.          Brigid White DNP, RN, APRN        Reviewed and Signed by PAC Mid-Level Provider/Resident  Mid-Level Provider/Resident: Brigid White DNP, RN, APRN  Date: 1/13/2017  Time: 1541    Attending Anesthesiologist Anesthesia Assessment:  STAFF:  66 y.o. woman with thoracic subluxation disease for additional spinal hardware by Dr. Wolfe  using general anesthesia.   History summarized above.  Surgery last summer was 14 hrs, and complicated by significant Post-OP cognitive dysfunction, bordering on delirium.   Airway is feasible and neck with FROM. Usual labs  Instructions given and questions answered.   Final plans by anesthesiology team on DOS.   ---rcp      Reviewed and Signed by PAC Anesthesiologist  Anesthesiologist: rcp  Date: 1/13/2017  Time:   Pass/Fail: Pass  Disposition:     PAC Pharmacist Assessment:        Pharmacist:   Date:   Time:      Anesthesia Plan      History & Physical Review  History and physical reviewed and following examination; no interval change.    ASA Status:  3 .    NPO Status:  > 8 hours    Plan for General with Intravenous and Propofol induction. Maintenance will be Balanced.    PONV prophylaxis:  Ondansetron (or other 5HT-3) and Dexamethasone or Solumedrol  Additional equipment: 2nd IV and Arterial Line (arterial line per surgeon request)      Postoperative Care  Postoperative pain management:  IV analgesics.      Consents  Anesthetic plan, risks, benefits and alternatives discussed with:  Patient and Spouse.  Use of blood products discussed: Yes.   Use of blood products discussed with Patient.  Consented to blood products.  .                           .

## 2017-01-14 LAB
BACTERIA SPEC CULT: NORMAL
Lab: NORMAL
MICRO REPORT STATUS: NORMAL
SPECIMEN SOURCE: NORMAL

## 2017-01-17 DIAGNOSIS — M99.12: Primary | ICD-10-CM

## 2017-01-17 LAB
ABO + RH BLD: ABNORMAL
ABO + RH BLD: ABNORMAL
BLD GP AB INVEST PLASRBC-IMP: ABNORMAL
BLD GP AB SCN SERPL QL: ABNORMAL
BLOOD BANK CMNT PATIENT-IMP: ABNORMAL
BLOOD BANK CMNT PATIENT-IMP: ABNORMAL
SPECIMEN EXP DATE BLD: ABNORMAL

## 2017-02-05 ENCOUNTER — APPOINTMENT (OUTPATIENT)
Dept: LAB | Facility: CLINIC | Age: 67
End: 2017-02-05
Attending: ORTHOPAEDIC SURGERY
Payer: MEDICARE

## 2017-02-05 DIAGNOSIS — M99.12: ICD-10-CM

## 2017-02-05 LAB
ABO + RH BLD: NORMAL
ABO + RH BLD: NORMAL
BLD GP AB SCN SERPL QL: NORMAL
BLOOD BANK CMNT PATIENT-IMP: NORMAL
SPECIMEN EXP DATE BLD: NORMAL

## 2017-02-06 ENCOUNTER — ANESTHESIA (OUTPATIENT)
Dept: SURGERY | Facility: CLINIC | Age: 67
DRG: 457 | End: 2017-02-06
Payer: MEDICARE

## 2017-02-06 ENCOUNTER — HOSPITAL ENCOUNTER (INPATIENT)
Facility: CLINIC | Age: 67
LOS: 4 days | Discharge: HOME HEALTH | DRG: 457 | End: 2017-02-10
Attending: ORTHOPAEDIC SURGERY | Admitting: ORTHOPAEDIC SURGERY
Payer: MEDICARE

## 2017-02-06 ENCOUNTER — APPOINTMENT (OUTPATIENT)
Dept: GENERAL RADIOLOGY | Facility: CLINIC | Age: 67
DRG: 457 | End: 2017-02-06
Attending: ORTHOPAEDIC SURGERY
Payer: MEDICARE

## 2017-02-06 DIAGNOSIS — J18.9 PNEUMONIA OF RIGHT LOWER LOBE DUE TO INFECTIOUS ORGANISM: ICD-10-CM

## 2017-02-06 DIAGNOSIS — Z98.1 S/P SPINAL FUSION: Primary | ICD-10-CM

## 2017-02-06 DIAGNOSIS — G25.81 RESTLESS LEG SYNDROME: ICD-10-CM

## 2017-02-06 DIAGNOSIS — M41.55 OTHER SECONDARY SCOLIOSIS, THORACOLUMBAR REGION: ICD-10-CM

## 2017-02-06 DIAGNOSIS — J45.21 REACTIVE AIRWAY DISEASE, MILD INTERMITTENT, WITH ACUTE EXACERBATION: ICD-10-CM

## 2017-02-06 PROBLEM — M43.14: Status: ACTIVE | Noted: 2017-02-06

## 2017-02-06 PROCEDURE — 25000125 ZZHC RX 250: Performed by: NURSE ANESTHETIST, CERTIFIED REGISTERED

## 2017-02-06 PROCEDURE — C1713 ANCHOR/SCREW BN/BN,TIS/BN: HCPCS | Performed by: ORTHOPAEDIC SURGERY

## 2017-02-06 PROCEDURE — 25000128 H RX IP 250 OP 636

## 2017-02-06 PROCEDURE — 95940 IONM IN OPERATNG ROOM 15 MIN: CPT | Performed by: ORTHOPAEDIC SURGERY

## 2017-02-06 PROCEDURE — 36000076 ZZH SURGERY LEVEL 6 EA 15 ADDTL MIN - UMMC: Performed by: ORTHOPAEDIC SURGERY

## 2017-02-06 PROCEDURE — 37000009 ZZH ANESTHESIA TECHNICAL FEE, EACH ADDTL 15 MIN: Performed by: ORTHOPAEDIC SURGERY

## 2017-02-06 PROCEDURE — 25000128 H RX IP 250 OP 636: Performed by: NURSE ANESTHETIST, CERTIFIED REGISTERED

## 2017-02-06 PROCEDURE — 99207 ZZC MOONLIGHTING INDICATOR: CPT | Performed by: INTERNAL MEDICINE

## 2017-02-06 PROCEDURE — 71000014 ZZH RECOVERY PHASE 1 LEVEL 2 FIRST HR: Performed by: ORTHOPAEDIC SURGERY

## 2017-02-06 PROCEDURE — 25800025 ZZH RX 258: Performed by: NURSE ANESTHETIST, CERTIFIED REGISTERED

## 2017-02-06 PROCEDURE — 8E0WXBF COMPUTER ASSISTED PROCEDURE OF TRUNK REGION, WITH FLUOROSCOPY: ICD-10-PCS | Performed by: ORTHOPAEDIC SURGERY

## 2017-02-06 PROCEDURE — 25000128 H RX IP 250 OP 636: Performed by: ORTHOPAEDIC SURGERY

## 2017-02-06 PROCEDURE — 95938 SOMATOSENSORY TESTING: CPT | Performed by: ORTHOPAEDIC SURGERY

## 2017-02-06 PROCEDURE — 27210794 ZZH OR GENERAL SUPPLY STERILE: Performed by: ORTHOPAEDIC SURGERY

## 2017-02-06 PROCEDURE — 40000278 XR SURGERY CARM FLUORO LESS THAN 5 MIN: Mod: TC

## 2017-02-06 PROCEDURE — 25000566 ZZH SEVOFLURANE, EA 15 MIN: Performed by: ORTHOPAEDIC SURGERY

## 2017-02-06 PROCEDURE — 27810322 ZZHC OR SPINE - CAGE/SPACER/DISK/CORD/CONNECTOR OPNP: Performed by: ORTHOPAEDIC SURGERY

## 2017-02-06 PROCEDURE — 25000125 ZZHC RX 250: Performed by: ORTHOPAEDIC SURGERY

## 2017-02-06 PROCEDURE — A9270 NON-COVERED ITEM OR SERVICE: HCPCS | Mod: GY | Performed by: ORTHOPAEDIC SURGERY

## 2017-02-06 PROCEDURE — 0RG6071 FUSION OF THORACIC VERTEBRAL JOINT WITH AUTOLOGOUS TISSUE SUBSTITUTE, POSTERIOR APPROACH, POSTERIOR COLUMN, OPEN APPROACH: ICD-10-PCS | Performed by: ORTHOPAEDIC SURGERY

## 2017-02-06 PROCEDURE — 36000078 ZZH SURGERY LEVEL 6 W FLUORO 1ST 30 MIN - UMMC: Performed by: ORTHOPAEDIC SURGERY

## 2017-02-06 PROCEDURE — 27211020 ZZHC OR CELL SAVER OPNP: Performed by: ORTHOPAEDIC SURGERY

## 2017-02-06 PROCEDURE — 25000125 ZZHC RX 250: Performed by: ANESTHESIOLOGY

## 2017-02-06 PROCEDURE — 25900017 H RX MED GY IP 259 OP 259 PS 637: Mod: GY | Performed by: ORTHOPAEDIC SURGERY

## 2017-02-06 PROCEDURE — 37000008 ZZH ANESTHESIA TECHNICAL FEE, 1ST 30 MIN: Performed by: ORTHOPAEDIC SURGERY

## 2017-02-06 PROCEDURE — 20000004 ZZH R&B ICU UMMC

## 2017-02-06 PROCEDURE — C1762 CONN TISS, HUMAN(INC FASCIA): HCPCS | Performed by: ORTHOPAEDIC SURGERY

## 2017-02-06 PROCEDURE — 4A11X4G MONITORING OF PERIPHERAL NERVOUS ELECTRICAL ACTIVITY, INTRAOPERATIVE, EXTERNAL APPROACH: ICD-10-PCS | Performed by: ORTHOPAEDIC SURGERY

## 2017-02-06 PROCEDURE — P9041 ALBUMIN (HUMAN),5%, 50ML: HCPCS | Performed by: NURSE ANESTHETIST, CERTIFIED REGISTERED

## 2017-02-06 PROCEDURE — 25000132 ZZH RX MED GY IP 250 OP 250 PS 637: Mod: GY | Performed by: ORTHOPAEDIC SURGERY

## 2017-02-06 PROCEDURE — 27211022 ZZHC OR IOM SUPPLIES OPNP: Performed by: ORTHOPAEDIC SURGERY

## 2017-02-06 PROCEDURE — 95939 C MOTOR EVOKED UPR&LWR LIMBS: CPT | Performed by: ORTHOPAEDIC SURGERY

## 2017-02-06 PROCEDURE — 0RG60AJ FUSION OF THORACIC VERTEBRAL JOINT WITH INTERBODY FUSION DEVICE, POSTERIOR APPROACH, ANTERIOR COLUMN, OPEN APPROACH: ICD-10-PCS | Performed by: ORTHOPAEDIC SURGERY

## 2017-02-06 PROCEDURE — 25000301 ZZH OR RX SURGIFLO W/THROMBIN KIT 2ML 1991 OPNP: Performed by: ORTHOPAEDIC SURGERY

## 2017-02-06 PROCEDURE — 8E0WXBG COMPUTER ASSISTED PROCEDURE OF TRUNK REGION, WITH COMPUTERIZED TOMOGRAPHY: ICD-10-PCS | Performed by: ORTHOPAEDIC SURGERY

## 2017-02-06 PROCEDURE — L8699 PROSTHETIC IMPLANT NOS: HCPCS | Performed by: ORTHOPAEDIC SURGERY

## 2017-02-06 PROCEDURE — 40000170 ZZH STATISTIC PRE-PROCEDURE ASSESSMENT II: Performed by: ORTHOPAEDIC SURGERY

## 2017-02-06 PROCEDURE — 87640 STAPH A DNA AMP PROBE: CPT | Performed by: INTERNAL MEDICINE

## 2017-02-06 PROCEDURE — 25000128 H RX IP 250 OP 636: Performed by: ANESTHESIOLOGY

## 2017-02-06 PROCEDURE — 27210995 ZZH RX 272: Performed by: ORTHOPAEDIC SURGERY

## 2017-02-06 PROCEDURE — 87641 MR-STAPH DNA AMP PROBE: CPT | Performed by: INTERNAL MEDICINE

## 2017-02-06 PROCEDURE — 0PW404Z REVISION OF INTERNAL FIXATION DEVICE IN THORACIC VERTEBRA, OPEN APPROACH: ICD-10-PCS | Performed by: ORTHOPAEDIC SURGERY

## 2017-02-06 DEVICE — IMP SCR MEDT 5.5/6.0MM SOLERA 5.5X35MM MA 55840005535: Type: IMPLANTABLE DEVICE | Site: SPINE THORACIC | Status: FUNCTIONAL

## 2017-02-06 DEVICE — IMPLANTABLE DEVICE: Type: IMPLANTABLE DEVICE | Site: SPINE THORACIC | Status: FUNCTIONAL

## 2017-02-06 DEVICE — IMP SCR MEDT 5.5/6.0MM SOLERA 6.5X35MM MA 55840006535: Type: IMPLANTABLE DEVICE | Site: SPINE THORACIC | Status: FUNCTIONAL

## 2017-02-06 DEVICE — IMP SCR SET MEDT SOLERA BREAK OFF 5.5MM TI 5540030: Type: IMPLANTABLE DEVICE | Site: SPINE THORACIC | Status: FUNCTIONAL

## 2017-02-06 DEVICE — IMP SCR MEDT 5.5/6.0MM SOLERA 5.5X30MM MA 55840005530: Type: IMPLANTABLE DEVICE | Site: SPINE THORACIC | Status: FUNCTIONAL

## 2017-02-06 DEVICE — IMP SCR DANEK LEGACY BREAKOFF SET 5.5MM TI 7540020: Type: IMPLANTABLE DEVICE | Site: SPINE THORACIC | Status: FUNCTIONAL

## 2017-02-06 DEVICE — GRAFT BONE INFUSE BMP SM 7510200: Type: IMPLANTABLE DEVICE | Site: SPINE THORACIC | Status: FUNCTIONAL

## 2017-02-06 DEVICE — IMP SCR MEDT 5.5/6.0MM SOLERA 6.5X30MM MA 55840006530: Type: IMPLANTABLE DEVICE | Site: SPINE THORACIC | Status: FUNCTIONAL

## 2017-02-06 DEVICE — GRAFT BONE CRUSH CANC 15ML 400075: Type: IMPLANTABLE DEVICE | Site: SPINE THORACIC | Status: FUNCTIONAL

## 2017-02-06 DEVICE — IMP SCR SET DOMINO MEDT 5.5 TO 5.5MM 779170005: Type: IMPLANTABLE DEVICE | Site: SPINE THORACIC | Status: FUNCTIONAL

## 2017-02-06 DEVICE — IMP ROD MEDT SOLERA LINED 5.5X500MM CHR 1555200500: Type: IMPLANTABLE DEVICE | Site: SPINE THORACIC | Status: FUNCTIONAL

## 2017-02-06 RX ORDER — ACETAMINOPHEN 325 MG/1
650 TABLET ORAL EVERY 4 HOURS PRN
Status: DISCONTINUED | OUTPATIENT
Start: 2017-02-09 | End: 2017-02-10 | Stop reason: HOSPADM

## 2017-02-06 RX ORDER — DIAZEPAM 5 MG
5 TABLET ORAL EVERY 6 HOURS PRN
Status: DISCONTINUED | OUTPATIENT
Start: 2017-02-06 | End: 2017-02-10 | Stop reason: HOSPADM

## 2017-02-06 RX ORDER — ONDANSETRON 4 MG/1
4 TABLET, ORALLY DISINTEGRATING ORAL EVERY 30 MIN PRN
Status: DISCONTINUED | OUTPATIENT
Start: 2017-02-06 | End: 2017-02-06 | Stop reason: HOSPADM

## 2017-02-06 RX ORDER — NORTRIPTYLINE HCL 10 MG
10 CAPSULE ORAL AT BEDTIME
Status: DISCONTINUED | OUTPATIENT
Start: 2017-02-06 | End: 2017-02-10 | Stop reason: HOSPADM

## 2017-02-06 RX ORDER — NALOXONE HYDROCHLORIDE 0.4 MG/ML
.1-.4 INJECTION, SOLUTION INTRAMUSCULAR; INTRAVENOUS; SUBCUTANEOUS
Status: DISCONTINUED | OUTPATIENT
Start: 2017-02-06 | End: 2017-02-10 | Stop reason: HOSPADM

## 2017-02-06 RX ORDER — LABETALOL HYDROCHLORIDE 5 MG/ML
10 INJECTION, SOLUTION INTRAVENOUS
Status: DISCONTINUED | OUTPATIENT
Start: 2017-02-06 | End: 2017-02-06 | Stop reason: HOSPADM

## 2017-02-06 RX ORDER — ONDANSETRON 2 MG/ML
4 INJECTION INTRAMUSCULAR; INTRAVENOUS EVERY 30 MIN PRN
Status: DISCONTINUED | OUTPATIENT
Start: 2017-02-06 | End: 2017-02-06 | Stop reason: HOSPADM

## 2017-02-06 RX ORDER — PROPOFOL 10 MG/ML
INJECTION, EMULSION INTRAVENOUS CONTINUOUS PRN
Status: DISCONTINUED | OUTPATIENT
Start: 2017-02-06 | End: 2017-02-06

## 2017-02-06 RX ORDER — LIDOCAINE HYDROCHLORIDE 20 MG/ML
INJECTION, SOLUTION INFILTRATION; PERINEURAL PRN
Status: DISCONTINUED | OUTPATIENT
Start: 2017-02-06 | End: 2017-02-06

## 2017-02-06 RX ORDER — SODIUM CHLORIDE 9 MG/ML
INJECTION, SOLUTION INTRAVENOUS CONTINUOUS
Status: DISCONTINUED | OUTPATIENT
Start: 2017-02-06 | End: 2017-02-07

## 2017-02-06 RX ORDER — HYDRALAZINE HYDROCHLORIDE 20 MG/ML
10 INJECTION INTRAMUSCULAR; INTRAVENOUS EVERY 6 HOURS PRN
Status: DISCONTINUED | OUTPATIENT
Start: 2017-02-06 | End: 2017-02-08

## 2017-02-06 RX ORDER — ONDANSETRON 2 MG/ML
4 INJECTION INTRAMUSCULAR; INTRAVENOUS EVERY 6 HOURS PRN
Status: DISCONTINUED | OUTPATIENT
Start: 2017-02-07 | End: 2017-02-10 | Stop reason: HOSPADM

## 2017-02-06 RX ORDER — POLYETHYLENE GLYCOL 3350 17 G/17G
17 POWDER, FOR SOLUTION ORAL DAILY
Status: DISCONTINUED | OUTPATIENT
Start: 2017-02-06 | End: 2017-02-08

## 2017-02-06 RX ORDER — PROPOFOL 10 MG/ML
INJECTION, EMULSION INTRAVENOUS PRN
Status: DISCONTINUED | OUTPATIENT
Start: 2017-02-06 | End: 2017-02-06

## 2017-02-06 RX ORDER — CEFAZOLIN SODIUM 2 G/100ML
2 INJECTION, SOLUTION INTRAVENOUS
Status: COMPLETED | OUTPATIENT
Start: 2017-02-06 | End: 2017-02-06

## 2017-02-06 RX ORDER — AMOXICILLIN 250 MG
1-2 CAPSULE ORAL 2 TIMES DAILY
Status: DISCONTINUED | OUTPATIENT
Start: 2017-02-06 | End: 2017-02-08

## 2017-02-06 RX ORDER — OXYCODONE HYDROCHLORIDE 5 MG/1
5-10 TABLET ORAL EVERY 4 HOURS PRN
Status: DISCONTINUED | OUTPATIENT
Start: 2017-02-06 | End: 2017-02-07

## 2017-02-06 RX ORDER — DIMENHYDRINATE 50 MG/ML
25 INJECTION, SOLUTION INTRAMUSCULAR; INTRAVENOUS
Status: DISCONTINUED | OUTPATIENT
Start: 2017-02-06 | End: 2017-02-06 | Stop reason: HOSPADM

## 2017-02-06 RX ORDER — VANCOMYCIN HYDROCHLORIDE 1 G/20ML
INJECTION, POWDER, LYOPHILIZED, FOR SOLUTION INTRAVENOUS PRN
Status: DISCONTINUED | OUTPATIENT
Start: 2017-02-06 | End: 2017-02-06 | Stop reason: HOSPADM

## 2017-02-06 RX ORDER — FENTANYL CITRATE 50 UG/ML
INJECTION, SOLUTION INTRAMUSCULAR; INTRAVENOUS PRN
Status: DISCONTINUED | OUTPATIENT
Start: 2017-02-06 | End: 2017-02-06

## 2017-02-06 RX ORDER — CALCIUM CARBONATE 500 MG/1
500-1000 TABLET, CHEWABLE ORAL 4 TIMES DAILY PRN
Status: DISCONTINUED | OUTPATIENT
Start: 2017-02-06 | End: 2017-02-10 | Stop reason: HOSPADM

## 2017-02-06 RX ORDER — HYDROXYZINE HYDROCHLORIDE 10 MG/1
10 TABLET, FILM COATED ORAL EVERY 6 HOURS PRN
Status: DISCONTINUED | OUTPATIENT
Start: 2017-02-06 | End: 2017-02-10 | Stop reason: HOSPADM

## 2017-02-06 RX ORDER — VENLAFAXINE 75 MG/1
225 TABLET ORAL EVERY EVENING
Status: DISCONTINUED | OUTPATIENT
Start: 2017-02-06 | End: 2017-02-10 | Stop reason: HOSPADM

## 2017-02-06 RX ORDER — SODIUM CHLORIDE, SODIUM LACTATE, POTASSIUM CHLORIDE, CALCIUM CHLORIDE 600; 310; 30; 20 MG/100ML; MG/100ML; MG/100ML; MG/100ML
INJECTION, SOLUTION INTRAVENOUS CONTINUOUS
Status: DISCONTINUED | OUTPATIENT
Start: 2017-02-06 | End: 2017-02-06 | Stop reason: HOSPADM

## 2017-02-06 RX ORDER — DEXAMETHASONE SODIUM PHOSPHATE 4 MG/ML
INJECTION, SOLUTION INTRA-ARTICULAR; INTRALESIONAL; INTRAMUSCULAR; INTRAVENOUS; SOFT TISSUE PRN
Status: DISCONTINUED | OUTPATIENT
Start: 2017-02-06 | End: 2017-02-06

## 2017-02-06 RX ORDER — NALOXONE HYDROCHLORIDE 0.4 MG/ML
.1-.4 INJECTION, SOLUTION INTRAMUSCULAR; INTRAVENOUS; SUBCUTANEOUS
Status: DISCONTINUED | OUTPATIENT
Start: 2017-02-06 | End: 2017-02-07

## 2017-02-06 RX ORDER — DEXAMETHASONE SODIUM PHOSPHATE 4 MG/ML
4 INJECTION, SOLUTION INTRA-ARTICULAR; INTRALESIONAL; INTRAMUSCULAR; INTRAVENOUS; SOFT TISSUE
Status: DISCONTINUED | OUTPATIENT
Start: 2017-02-06 | End: 2017-02-06 | Stop reason: HOSPADM

## 2017-02-06 RX ORDER — DIPHENHYDRAMINE HCL 12.5MG/5ML
12.5 LIQUID (ML) ORAL EVERY 6 HOURS PRN
Status: DISCONTINUED | OUTPATIENT
Start: 2017-02-06 | End: 2017-02-10 | Stop reason: HOSPADM

## 2017-02-06 RX ORDER — CEFAZOLIN SODIUM 1 G/3ML
INJECTION, POWDER, FOR SOLUTION INTRAMUSCULAR; INTRAVENOUS PRN
Status: DISCONTINUED | OUTPATIENT
Start: 2017-02-06 | End: 2017-02-06

## 2017-02-06 RX ORDER — GLYCOPYRROLATE 0.2 MG/ML
INJECTION, SOLUTION INTRAMUSCULAR; INTRAVENOUS PRN
Status: DISCONTINUED | OUTPATIENT
Start: 2017-02-06 | End: 2017-02-06

## 2017-02-06 RX ORDER — HYDROMORPHONE HYDROCHLORIDE 1 MG/ML
.3-.5 INJECTION, SOLUTION INTRAMUSCULAR; INTRAVENOUS; SUBCUTANEOUS EVERY 5 MIN PRN
Status: DISCONTINUED | OUTPATIENT
Start: 2017-02-06 | End: 2017-02-06 | Stop reason: HOSPADM

## 2017-02-06 RX ORDER — DIPHENHYDRAMINE HYDROCHLORIDE 50 MG/ML
12.5 INJECTION INTRAMUSCULAR; INTRAVENOUS EVERY 6 HOURS PRN
Status: DISCONTINUED | OUTPATIENT
Start: 2017-02-06 | End: 2017-02-10 | Stop reason: HOSPADM

## 2017-02-06 RX ORDER — PROCHLORPERAZINE MALEATE 5 MG
5 TABLET ORAL EVERY 6 HOURS PRN
Status: DISCONTINUED | OUTPATIENT
Start: 2017-02-06 | End: 2017-02-10 | Stop reason: HOSPADM

## 2017-02-06 RX ORDER — CEFAZOLIN SODIUM 1 G/3ML
1 INJECTION, POWDER, FOR SOLUTION INTRAMUSCULAR; INTRAVENOUS SEE ADMIN INSTRUCTIONS
Status: DISCONTINUED | OUTPATIENT
Start: 2017-02-06 | End: 2017-02-06 | Stop reason: HOSPADM

## 2017-02-06 RX ORDER — ALBUMIN, HUMAN INJ 5% 5 %
SOLUTION INTRAVENOUS CONTINUOUS PRN
Status: DISCONTINUED | OUTPATIENT
Start: 2017-02-06 | End: 2017-02-06

## 2017-02-06 RX ORDER — NALOXONE HYDROCHLORIDE 0.4 MG/ML
.1-.4 INJECTION, SOLUTION INTRAMUSCULAR; INTRAVENOUS; SUBCUTANEOUS
Status: DISCONTINUED | OUTPATIENT
Start: 2017-02-06 | End: 2017-02-06

## 2017-02-06 RX ORDER — SODIUM CHLORIDE, SODIUM LACTATE, POTASSIUM CHLORIDE, CALCIUM CHLORIDE 600; 310; 30; 20 MG/100ML; MG/100ML; MG/100ML; MG/100ML
INJECTION, SOLUTION INTRAVENOUS CONTINUOUS PRN
Status: DISCONTINUED | OUTPATIENT
Start: 2017-02-06 | End: 2017-02-06

## 2017-02-06 RX ORDER — ONDANSETRON 2 MG/ML
INJECTION INTRAMUSCULAR; INTRAVENOUS PRN
Status: DISCONTINUED | OUTPATIENT
Start: 2017-02-06 | End: 2017-02-06

## 2017-02-06 RX ORDER — LIDOCAINE 40 MG/G
CREAM TOPICAL
Status: DISCONTINUED | OUTPATIENT
Start: 2017-02-06 | End: 2017-02-10 | Stop reason: HOSPADM

## 2017-02-06 RX ORDER — ACETAMINOPHEN 325 MG/1
975 TABLET ORAL EVERY 8 HOURS
Status: DISPENSED | OUTPATIENT
Start: 2017-02-06 | End: 2017-02-09

## 2017-02-06 RX ORDER — GABAPENTIN 100 MG/1
100 CAPSULE ORAL 3 TIMES DAILY
Status: COMPLETED | OUTPATIENT
Start: 2017-02-06 | End: 2017-02-09

## 2017-02-06 RX ORDER — SODIUM CHLORIDE 9 MG/ML
INJECTION, SOLUTION INTRAVENOUS
Status: COMPLETED
Start: 2017-02-06 | End: 2017-02-06

## 2017-02-06 RX ORDER — CEFAZOLIN SODIUM 1 G/3ML
1 INJECTION, POWDER, FOR SOLUTION INTRAMUSCULAR; INTRAVENOUS EVERY 8 HOURS
Status: COMPLETED | OUTPATIENT
Start: 2017-02-07 | End: 2017-02-07

## 2017-02-06 RX ORDER — NEOSTIGMINE METHYLSULFATE 1 MG/ML
VIAL (ML) INJECTION PRN
Status: DISCONTINUED | OUTPATIENT
Start: 2017-02-06 | End: 2017-02-06

## 2017-02-06 RX ORDER — FENTANYL CITRATE 50 UG/ML
25-50 INJECTION, SOLUTION INTRAMUSCULAR; INTRAVENOUS
Status: DISCONTINUED | OUTPATIENT
Start: 2017-02-06 | End: 2017-02-06 | Stop reason: HOSPADM

## 2017-02-06 RX ORDER — ONDANSETRON 4 MG/1
4 TABLET, ORALLY DISINTEGRATING ORAL EVERY 6 HOURS PRN
Status: DISCONTINUED | OUTPATIENT
Start: 2017-02-07 | End: 2017-02-10 | Stop reason: HOSPADM

## 2017-02-06 RX ORDER — HYDRALAZINE HYDROCHLORIDE 20 MG/ML
10 INJECTION INTRAMUSCULAR; INTRAVENOUS EVERY 6 HOURS PRN
Status: DISCONTINUED | OUTPATIENT
Start: 2017-02-06 | End: 2017-02-06

## 2017-02-06 RX ADMIN — GABAPENTIN 100 MG: 100 CAPSULE ORAL at 20:20

## 2017-02-06 RX ADMIN — FENTANYL CITRATE 50 MCG: 50 INJECTION, SOLUTION INTRAMUSCULAR; INTRAVENOUS at 15:55

## 2017-02-06 RX ADMIN — ONDANSETRON 4 MG: 2 INJECTION INTRAMUSCULAR; INTRAVENOUS at 17:47

## 2017-02-06 RX ADMIN — LIDOCAINE HYDROCHLORIDE 40 MG: 20 INJECTION, SOLUTION INFILTRATION; PERINEURAL at 13:46

## 2017-02-06 RX ADMIN — HYDROMORPHONE HYDROCHLORIDE 0.5 MG: 1 INJECTION, SOLUTION INTRAMUSCULAR; INTRAVENOUS; SUBCUTANEOUS at 18:05

## 2017-02-06 RX ADMIN — SODIUM CHLORIDE: 9 INJECTION, SOLUTION INTRAVENOUS at 20:01

## 2017-02-06 RX ADMIN — MIDAZOLAM HYDROCHLORIDE 2 MG: 1 INJECTION, SOLUTION INTRAMUSCULAR; INTRAVENOUS at 13:32

## 2017-02-06 RX ADMIN — FENTANYL CITRATE 50 MCG: 50 INJECTION, SOLUTION INTRAMUSCULAR; INTRAVENOUS at 18:45

## 2017-02-06 RX ADMIN — PROPOFOL 50 MG: 10 INJECTION, EMULSION INTRAVENOUS at 14:01

## 2017-02-06 RX ADMIN — HYDROMORPHONE HYDROCHLORIDE 0.4 MG: 1 INJECTION, SOLUTION INTRAMUSCULAR; INTRAVENOUS; SUBCUTANEOUS at 19:27

## 2017-02-06 RX ADMIN — SODIUM CHLORIDE 1 G: 9 INJECTION, SOLUTION INTRAVENOUS at 14:44

## 2017-02-06 RX ADMIN — CEFAZOLIN SODIUM 2 G: 2 INJECTION, SOLUTION INTRAVENOUS at 14:44

## 2017-02-06 RX ADMIN — PROPOFOL 50 MG: 10 INJECTION, EMULSION INTRAVENOUS at 14:36

## 2017-02-06 RX ADMIN — Medication 25 MG: at 13:46

## 2017-02-06 RX ADMIN — PHENYLEPHRINE HYDROCHLORIDE 50 MCG: 10 INJECTION, SOLUTION INTRAMUSCULAR; INTRAVENOUS; SUBCUTANEOUS at 15:30

## 2017-02-06 RX ADMIN — SODIUM CHLORIDE 1 MG/KG/HR: 9 INJECTION, SOLUTION INTRAVENOUS at 15:15

## 2017-02-06 RX ADMIN — FENTANYL CITRATE 100 MCG: 50 INJECTION, SOLUTION INTRAMUSCULAR; INTRAVENOUS at 15:04

## 2017-02-06 RX ADMIN — FENTANYL CITRATE 50 MCG: 50 INJECTION, SOLUTION INTRAMUSCULAR; INTRAVENOUS at 13:40

## 2017-02-06 RX ADMIN — NEOSTIGMINE METHYLSULFATE 2 MG: 1 INJECTION INTRAMUSCULAR; INTRAVENOUS; SUBCUTANEOUS at 18:26

## 2017-02-06 RX ADMIN — HYDROMORPHONE HYDROCHLORIDE 0.3 MG: 1 INJECTION, SOLUTION INTRAMUSCULAR; INTRAVENOUS; SUBCUTANEOUS at 18:50

## 2017-02-06 RX ADMIN — GLYCOPYRROLATE 0.4 MG: 0.2 INJECTION, SOLUTION INTRAMUSCULAR; INTRAVENOUS at 18:26

## 2017-02-06 RX ADMIN — FENTANYL CITRATE 50 MCG: 50 INJECTION, SOLUTION INTRAMUSCULAR; INTRAVENOUS at 13:46

## 2017-02-06 RX ADMIN — SODIUM CHLORIDE, POTASSIUM CHLORIDE, SODIUM LACTATE AND CALCIUM CHLORIDE: 600; 310; 30; 20 INJECTION, SOLUTION INTRAVENOUS at 15:15

## 2017-02-06 RX ADMIN — REMIFENTANIL HYDROCHLORIDE 0.05 MCG/KG/MIN: 1 INJECTION, POWDER, LYOPHILIZED, FOR SOLUTION INTRAVENOUS at 14:28

## 2017-02-06 RX ADMIN — PHENYLEPHRINE HYDROCHLORIDE 100 MCG: 10 INJECTION, SOLUTION INTRAMUSCULAR; INTRAVENOUS; SUBCUTANEOUS at 14:44

## 2017-02-06 RX ADMIN — PROPOFOL 100 MG: 10 INJECTION, EMULSION INTRAVENOUS at 13:46

## 2017-02-06 RX ADMIN — POLYETHYLENE GLYCOL 3350 17 G: 17 POWDER, FOR SOLUTION ORAL at 20:35

## 2017-02-06 RX ADMIN — NORTRIPTYLINE HYDROCHLORIDE 10 MG: 10 CAPSULE ORAL at 20:34

## 2017-02-06 RX ADMIN — DEXAMETHASONE SODIUM PHOSPHATE 4 MG: 4 INJECTION, SOLUTION INTRAMUSCULAR; INTRAVENOUS at 14:55

## 2017-02-06 RX ADMIN — SODIUM CHLORIDE, POTASSIUM CHLORIDE, SODIUM LACTATE AND CALCIUM CHLORIDE: 600; 310; 30; 20 INJECTION, SOLUTION INTRAVENOUS at 13:32

## 2017-02-06 RX ADMIN — FENTANYL CITRATE 50 MCG: 50 INJECTION, SOLUTION INTRAMUSCULAR; INTRAVENOUS at 14:01

## 2017-02-06 RX ADMIN — CEFAZOLIN 1 G: 1 INJECTION, POWDER, FOR SOLUTION INTRAMUSCULAR; INTRAVENOUS at 16:40

## 2017-02-06 RX ADMIN — HYDROMORPHONE HYDROCHLORIDE 0.5 MG: 1 INJECTION, SOLUTION INTRAMUSCULAR; INTRAVENOUS; SUBCUTANEOUS at 17:47

## 2017-02-06 RX ADMIN — DIPHENHYDRAMINE HYDROCHLORIDE 12.5 MG: 12.5 SOLUTION ORAL at 20:22

## 2017-02-06 RX ADMIN — PROPOFOL 150 MCG/KG/MIN: 10 INJECTION, EMULSION INTRAVENOUS at 14:43

## 2017-02-06 RX ADMIN — ACETAMINOPHEN 975 MG: 325 TABLET ORAL at 20:20

## 2017-02-06 RX ADMIN — FENTANYL CITRATE 50 MCG: 50 INJECTION, SOLUTION INTRAMUSCULAR; INTRAVENOUS at 18:14

## 2017-02-06 RX ADMIN — SENNOSIDES AND DOCUSATE SODIUM 1 TABLET: 8.6; 5 TABLET ORAL at 20:21

## 2017-02-06 RX ADMIN — FENTANYL CITRATE 50 MCG: 50 INJECTION, SOLUTION INTRAMUSCULAR; INTRAVENOUS at 15:00

## 2017-02-06 RX ADMIN — FENTANYL CITRATE 50 MCG: 50 INJECTION, SOLUTION INTRAMUSCULAR; INTRAVENOUS at 19:12

## 2017-02-06 RX ADMIN — VENLAFAXINE 225 MG: 75 TABLET ORAL at 20:21

## 2017-02-06 RX ADMIN — FENTANYL CITRATE 50 MCG: 50 INJECTION, SOLUTION INTRAMUSCULAR; INTRAVENOUS at 14:36

## 2017-02-06 RX ADMIN — ALBUMIN (HUMAN): 12.5 SOLUTION INTRAVENOUS at 15:15

## 2017-02-06 RX ADMIN — HYDROMORPHONE HYDROCHLORIDE 0.3 MG: 1 INJECTION, SOLUTION INTRAMUSCULAR; INTRAVENOUS; SUBCUTANEOUS at 19:17

## 2017-02-06 RX ADMIN — HYDROMORPHONE HYDROCHLORIDE 0.2 MG: 10 INJECTION, SOLUTION INTRAMUSCULAR; INTRAVENOUS; SUBCUTANEOUS at 20:16

## 2017-02-06 ASSESSMENT — ACTIVITIES OF DAILY LIVING (ADL)
RETIRED_EATING: 0-->INDEPENDENT
WHICH_OF_THE_ABOVE_FUNCTIONAL_RISKS_HAD_A_RECENT_ONSET_OR_CHANGE?: AMBULATION
SWALLOWING: 0-->SWALLOWS FOODS/LIQUIDS WITHOUT DIFFICULTY
TOILETING: 0-->INDEPENDENT
AMBULATION: 1-->ASSISTIVE EQUIPMENT
BATHING: 0-->INDEPENDENT
TRANSFERRING: 0-->INDEPENDENT
FALL_HISTORY_WITHIN_LAST_SIX_MONTHS: NO
RETIRED_COMMUNICATION: 0-->UNDERSTANDS/COMMUNICATES WITHOUT DIFFICULTY
COGNITION: 0 - NO COGNITION ISSUES REPORTED
DRESS: 0-->INDEPENDENT

## 2017-02-06 ASSESSMENT — PAIN DESCRIPTION - DESCRIPTORS: DESCRIPTORS: DULL;ACHING

## 2017-02-06 NOTE — IP AVS SNAPSHOT
[facility] [address]    [address]    [place] [address]    Phone:  [phone]                                       After Visit Summary   2/6/2017    Julisa Champion    MRN: [id number]           After Visit Summary Signature Page     I have received my discharge instructions, and my questions have been answered. I have discussed any challenges I see with this plan with the nurse or doctor.    ..........................................................................................................................................  Patient/Patient Representative Signature      ..........................................................................................................................................  Patient Representative Print Name and Relationship to Patient    ..................................................               ................................................  Date                                            Time    ..........................................................................................................................................  Reviewed by Signature/Title    ...................................................              ..............................................  Date                                                            Time

## 2017-02-06 NOTE — LETTER
Transition Communication Hand-off for Care Transitions to Next Level of Care Provider    Name: Julisa Champion  MRN #: [id number]  Primary Care Provider: Clinic Other     Reason for Hospitalization:  Spondylolisthesis, thoracic region [M43.14]  S/P spinal surgery [Z98.890]  Admit Date/Time: 2/6/2017 10:31 AM  Expected Discharge Date: 2/10 vs 2/11  Payor Source: Payor: MEDICARE / Plan: MEDICARE / Product Type: Medicare /     Reason for Communication Hand-off Referral: Patient of Dr. Wolfe's    Discharge Plan: home with family support  Follow-up plan:  Future Appointments  Date Time Provider Department Center   2/9/2017 3:30 PM Emily Douglass Pt, PT [facility]   3/23/2017 11:45 AM Isauro Wolfe MD [facility]   4/20/2017 10:30 AM Isauro Wolfe MD [facility]       AVS/Discharge Summary included

## 2017-02-06 NOTE — ANESTHESIA PROCEDURE NOTES
Arterial Line Procedure Note  Staff:     Anesthesiologist:  MAYCOL MADERA  Location: In OR After Induction  Procedure Start/Stop Times:     patient identified, IV checked, risks and benefits discussed, informed consent, monitors and equipment checked, pre-op evaluation and at physician/surgeon's request      Correct Patient: Yes    Line Placement:     Procedure:  Arterial Line    Insertion Site:  Radial    Insertion laterality:  Left    Skin Prep: Chloraprep      Patient Prep: patient draped, mask, sterile gloves and hand hygiene      Local skin infiltration:  None    Ultrasound Guided?: Yes      Artery evaluated via ultrasound confirming patency.   Using realtime imaging, the artery was punctured and the needle was observed entering the artery.      A permanent image is NOT entered into the patient's record.      Catheter size:  Other (See Comment) (20 g arrow kit)    Dressing:  Tegaderm    : small hematoma on right side, resolved with pressure.    Arterial waveform: Yes      IBP within 10% of NIBP: Yes

## 2017-02-06 NOTE — PROGRESS NOTES
Neurosurgery Pre-Operative Exam:  Patient seen and examined in pre-op holding area    AAOx3, speech fluent and normal  Intermittent pain in right breast area and in bilateral lateral thighs (sharp stabbing pain)    Tingling and numbness in bilateral fingers 4 and 5    Hand intrinsic bilateral weakness 3/5 strength finger abduction, 4/5 finger flexion/  Strength 5/5 BLE  Reflexes 3+ brachialis, biceps, patellar bilaterally  No ankle clonus      Zulay Morocho MD  Neurosurgery

## 2017-02-06 NOTE — IP AVS SNAPSHOT
MRN:[id number]                      After Visit Summary   2/6/2017    Julisa Champion    MRN: [id number]           Thank you!     Thank you for choosing [facility] for your care. Our goal is always to provide you with excellent care. Hearing back from our patients is one way we can continue to improve our services. Please take a few minutes to complete the written survey that you may receive in the mail after you visit with us. Thank you!        Patient Information     Date Of Birth          1950        About your hospital stay     You were admitted on:  February 6, 2017 You last received care in the:  [facility]    You were discharged on:  February 10, 2017        Reason for your hospital stay       You were in the hospital to undergo Extension of fusion to T1, pedicle screw instrumentation T1, decompression bilaterally T1-T2, Bautista Sanchez osteotomy T1-T2, placement intervertebral device T1-T2, image-guided surgery, posterior spinal fusion T1-T2.                    Who to Call     For medical emergencies, please call 911.  For non-urgent questions about your medical care, please call your primary care provider or clinic, None  For questions related to your surgery, please call your surgery clinic        Attending Provider     Provider    Isauro Wolfe MD       Primary Care Provider    [facility] Other Clinic                After Care Instructions     Activity       Your activity upon discharge: Up ad elver, No bending, twisting, or lifting > 10 pounds. Cervical collar (Miami J) at all times except for hygiene. No head of bed restrictions.            Diet       Follow this diet upon discharge: Regular            Discharge Instructions       Postoperative Instructions - Spinal fusion        Dr. Isauro Wolfe  [address] [address]     You have had a spinal fusion. You have a dressing called Aquacel on your incision which can be worn for up  to 7 days, and it can be worn in the shower. After the Aquacel has been removed, you do not need a dressing. There are steri strips directly over the incision. Those may stay in place until they fall off, which can take up to 2 weeks. It is okay to shower and wash gently with soap and water. Do not soak in the bath. No pools, hot tubs, or lakes for 6 weeks.     For postoperative pain control, I have prescribed a narcotic medication.  This should be taken for the first few weeks following surgery, but as soon as you are able, transition to an over-the-counter type medication such as Tylenol.  You may not take non-steroidal anti-inflammatory medications (eg: Advil, Ibuprofen, Aleve, others) for the first 3 months after surgery as it interferes with bone healing. While taking the narcotic medication, there are several precautions that you must adhere to. These medications have numerous side effects including nausea, constipation, and drowsiness. If you experience nausea, this may be relieved by taking the medication with food or a light meal. To avoid constipation, please use an over-the-counter stool softener or drink lots of water and eat fruits and vegetables. Avoid operating heavy machinery or driving an automobile while on narcotic medications.      You will be seen in the clinic at 6 weeks following surgery. You will not need to attend physical therapy during this time. You can focus on cardiovascular fitness by walking as much as you can tolerate. Avoid bending, lifting, and twisting. Your weight lifting restriction is 10 pounds until your first follow-up appointment.      When you get home, you may resume your normal diet as tolerated. You may not be very hungry but try to eat small healthy meals to help you heal. Remember to drink plenty of water/fluids to help keep you hydrated.    After surgery, you may have a sensitive scar.  When the dressing has been removed, you may massage the scar to decrease  sensitivity and help break down scar tissue. Do this up to 4 times per day.    Please call or return if you experience the following:  Fevers (temperature greater than 100.4 degrees Fahrenheit)  Pain that is getting worse or does not respond to pain medications  Drainage from your wound  Increasing redness about the wound  Any other worrisome symptoms    You may reach the clinic by dialing [phone].  After hours, you may reach the resident on call by dialing [phone].            Discharge Instructions       C with RN - check clinically, pulmonary status/sat, VS, Hgb, BMP - update PMD  Use incentive spirometer and flutter valve  Call primary MD to arrange follow up - decision when to resume lisinopril/HCTZ  Wean oxycodone as able.                  Follow-up Appointments     Adult [facility] Follow-up and recommended labs and tests       Follow up with Dr. Wolfe on 3/23/2017 at 11:45 AM at [facility] ([address] [place] [address]). Call [phone] to schedule a follow-up appointment at this location with your provider.                  Your next 10 appointments already scheduled     Mar 23, 2017 11:45 AM   (Arrive by 11:15 AM)   RETURN SPINE with Isauro Wolfe MD   [facility])    [address] [place] [id number] [phone]            Apr 20, 2017 10:30 AM   (Arrive by 10:00 AM)   RETURN SPINE with Isauro Wolfe MD   [facility])    [address] [place] [id number] [phone]              Pending Results     No orders found from 2/5/2017 to 2/7/2017.            Statement of Approval     Ordered          02/10/17 0716  I have reviewed and agree with all the recommendations and orders detailed in this document.   EFFECTIVE NOW     Approved and electronically signed by:  Logan Martini MD         "   02/10/17 0912  I have reviewed and agree with all the recommendations and orders detailed in this document.     Approved and electronically signed by:  Edd Abreu MD             Admission Information        Provider Department Dept Phone    2/6/2017 Isauro Wolfe MD [facility] [phone]      Your Vitals Were     Blood Pressure Pulse Temperature    137/61 mmHg 111 97.2  F (36.2  C) (Oral)    Respirations Height Weight    16 1.651 m (5' 5\") 117.935 kg (260 lb)    BMI (Body Mass Index) Pulse Oximetry       43.27 kg/m2 99%       MyChart Information     [employer] gives you secure access to your electronic health record. If you see a primary care provider, you can also send messages to your care team and make appointments. If you have questions, please call your primary care clinic.  If you do not have a primary care provider, please call [phone] and they will assist you.        Care EveryWhere ID     This is your Care EveryWhere ID. This could be used by other organizations to access your [facility] medical records  [id number]           Review of your medicines      START taking        Dose / Directions    diazepam 5 MG tablet   Commonly known as:  VALIUM   Used for:  S/P spinal fusion        Dose:  5 mg   Take 1 tablet (5 mg) by mouth every 6 hours as needed for anxiety or other (muscle spasms)   Quantity:  20 tablet   Refills:  0       gabapentin 100 MG capsule   Commonly known as:  NEURONTIN   Used for:  S/P spinal fusion        Dose:  100 mg   Take 1 capsule (100 mg) by mouth 3 times daily   Quantity:  90 capsule   Refills:  0       guaiFENesin 600 MG 12 hr tablet   Commonly known as:  MUCINEX   Used for:  Reactive airway disease, mild intermittent, with acute exacerbation        Dose:  600 mg   Take 1 tablet (600 mg) by mouth 2 times daily   Quantity:  20 tablet   Refills:  0       levofloxacin 750 MG tablet   Commonly known as:  LEVAQUIN   Indication:  Healthcare-Associated Pneumonia   Used " for:  Pneumonia of right lower lobe due to infectious organism        Dose:  750 mg   Take 1 tablet (750 mg) by mouth every 24 hours   Quantity:  6 tablet   Refills:  0       oxyCODONE 5 MG IR tablet   Commonly known as:  ROXICODONE   Used for:  S/P spinal fusion        Dose:  5-10 mg   Take 1-2 tablets (5-10 mg) by mouth every 4 hours as needed for moderate to severe pain   Quantity:  100 tablet   Refills:  0         CONTINUE these medicines which may have CHANGED, or have new prescriptions. If we are uncertain of the size of tablets/capsules you have at home, strength may be listed as something that might have changed.        Dose / Directions    * albuterol (5 MG/ML) 0.5% neb solution   Commonly known as:  PROVENTIL   This may have changed:  You were already taking a medication with the same name, and this prescription was added. Make sure you understand how and when to take each.   Used for:  Reactive airway disease, mild intermittent, with acute exacerbation        Dose:  2.5 mg   Take 0.5 mLs (2.5 mg) by nebulization every 4 hours as needed for wheezing or shortness of breath / dyspnea   Quantity:  60 vial   Refills:  0       * albuterol (2.5 MG/3ML) 0.083% neb solution   This may have changed:  You were already taking a medication with the same name, and this prescription was added. Make sure you understand how and when to take each.   Used for:  Reactive airway disease, mild intermittent, with acute exacerbation        Dose:  2.5 mg   Take 1 vial (2.5 mg) by nebulization 4 times daily   Quantity:  1 Box   Refills:  0       * albuterol (2.5 MG/3ML) 0.083% neb solution   This may have changed:  Another medication with the same name was added. Make sure you understand how and when to take each.        Dose:  1 vial   Take 1 vial (2.5 mg) by nebulization every 4 hours as needed for shortness of breath / dyspnea or wheezing   Quantity:  25 vial   Refills:  0       * senna-docusate 8.6-50 MG per tablet   Commonly  known as:  SENOKOT-S;PERICOLACE   This may have changed:  Another medication with the same name was added. Make sure you understand how and when to take each.   Used for:  S/P spinal fusion, Other secondary scoliosis, thoracolumbar region        Dose:  1-2 tablet   Take 1-2 tablets by mouth 2 times daily as needed for constipation   Quantity:  100 tablet   Refills:  0       * senna-docusate 8.6-50 MG per tablet   Commonly known as:  SENOKOT-S;PERICOLACE   This may have changed:  You were already taking a medication with the same name, and this prescription was added. Make sure you understand how and when to take each.   Used for:  S/P spinal fusion        Dose:  1-2 tablet   Take 1-2 tablets by mouth daily as needed for constipation   Quantity:  60 tablet   Refills:  2       * Notice:  This list has 5 medication(s) that are the same as other medications prescribed for you. Read the directions carefully, and ask your doctor or other care provider to review them with you.      CONTINUE these medicines which have NOT CHANGED        Dose / Directions    acetaminophen 500 MG tablet   Commonly known as:  TYLENOL        Dose:  1000 mg   Take 2 tablets (1,000 mg) by mouth every 6 hours as needed for mild pain or fever   Refills:  0       EFFEXOR PO        Dose:  225 mg   Take 225 mg by mouth every evening   Refills:  0       nortriptyline 10 MG capsule   Commonly known as:  PAMELOR   Used for:  Restless leg syndrome        Dose:  10 mg   Take 1 capsule (10 mg) by mouth At Bedtime   Quantity:  30 capsule   Refills:  0       order for DME   Used for:  S/P spinal fusion        Equipment being ordered: Walker Wheels () and Walker () Treatment Diagnosis: Gait instability   Quantity:  1 each   Refills:  0       polyethylene glycol Packet   Commonly known as:  MIRALAX/GLYCOLAX   Used for:  S/P spinal fusion, Other secondary scoliosis, thoracolumbar region        Dose:  17 g   Take 17 g by mouth daily as needed for  constipation   Quantity:  30 packet   Refills:  1         STOP taking     lisinopril-hydrochlorothiazide 20-12.5 MG per tablet   Commonly known as:  PRINZIDE/ZESTORETIC           methocarbamol 750 MG tablet   Commonly known as:  ROBAXIN           traMADol 50 MG tablet   Commonly known as:  ULTRAM                Where to get your medicines      These medications were sent to [facility] [place], [place] - [address]  [address] [facility] [address], [facility] [address]     Phone:  [phone]    - albuterol (2.5 MG/3ML) 0.083% neb solution  - albuterol (5 MG/ML) 0.5% neb solution  - gabapentin 100 MG capsule  - guaiFENesin 600 MG 12 hr tablet  - levofloxacin 750 MG tablet  - nortriptyline 10 MG capsule  - senna-docusate 8.6-50 MG per tablet  - senna-docusate 8.6-50 MG per tablet      Some of these will need a paper prescription and others can be bought over the counter. Ask your nurse if you have questions.     Bring a paper prescription for each of these medications    - diazepam 5 MG tablet  - oxyCODONE 5 MG IR tablet             Protect others around you: Learn how to safely use, store and throw away your medicines at www.disposemymeds.org.             Medication List: This is a list of all your medications and when to take them. Check marks below indicate your daily home schedule. Keep this list as a reference.      Medications           Morning Afternoon Evening Bedtime As Needed    acetaminophen 500 MG tablet   Commonly known as:  TYLENOL   Take 2 tablets (1,000 mg) by mouth every 6 hours as needed for mild pain or fever   Last time this was given:  650 mg on 2/10/2017  1:28 AM                                * albuterol (5 MG/ML) 0.5% neb solution   Commonly known as:  PROVENTIL   Take 0.5 mLs (2.5 mg) by nebulization every 4 hours as needed for wheezing or shortness of breath / dyspnea   Last time this was given:  2.5 mg on 2/8/2017  3:08 AM                                * albuterol (2.5 MG/3ML)  0.083% neb solution   Take 1 vial (2.5 mg) by nebulization 4 times daily   Last time this was given:  2.5 mg on 2/10/2017  7:44 AM                                * albuterol (2.5 MG/3ML) 0.083% neb solution   Take 1 vial (2.5 mg) by nebulization every 4 hours as needed for shortness of breath / dyspnea or wheezing   Last time this was given:  2.5 mg on 2/10/2017  7:44 AM                                diazepam 5 MG tablet   Commonly known as:  VALIUM   Take 1 tablet (5 mg) by mouth every 6 hours as needed for anxiety or other (muscle spasms)   Last time this was given:  5 mg on 2/10/2017  1:28 AM                                EFFEXOR PO   Take 225 mg by mouth every evening   Last time this was given:  225 mg on 2/9/2017  8:54 PM                                gabapentin 100 MG capsule   Commonly known as:  NEURONTIN   Take 1 capsule (100 mg) by mouth 3 times daily   Last time this was given:  100 mg on 2/9/2017  2:29 PM                                guaiFENesin 600 MG 12 hr tablet   Commonly known as:  MUCINEX   Take 1 tablet (600 mg) by mouth 2 times daily   Last time this was given:  600 mg on 2/10/2017  8:23 AM                                levofloxacin 750 MG tablet   Commonly known as:  LEVAQUIN   Take 1 tablet (750 mg) by mouth every 24 hours   Last time this was given:  750 mg on 2/9/2017  8:54 PM                                nortriptyline 10 MG capsule   Commonly known as:  PAMELOR   Take 1 capsule (10 mg) by mouth At Bedtime   Last time this was given:  10 mg on 2/9/2017 10:34 PM                                order for DME   Equipment being ordered: Walker Wheels () and Walker () Treatment Diagnosis: Gait instability                                oxyCODONE 5 MG IR tablet   Commonly known as:  ROXICODONE   Take 1-2 tablets (5-10 mg) by mouth every 4 hours as needed for moderate to severe pain   Last time this was given:  10 mg on 2/10/2017  9:25 AM                                polyethylene  glycol Packet   Commonly known as:  MIRALAX/GLYCOLAX   Take 17 g by mouth daily as needed for constipation   Last time this was given:  17 g on 2/9/2017  8:56 PM                                * senna-docusate 8.6-50 MG per tablet   Commonly known as:  SENOKOT-S;PERICOLACE   Take 1-2 tablets by mouth 2 times daily as needed for constipation   Last time this was given:  2 tablets on 2/9/2017  9:08 AM                                * senna-docusate 8.6-50 MG per tablet   Commonly known as:  SENOKOT-S;PERICOLACE   Take 1-2 tablets by mouth daily as needed for constipation   Last time this was given:  2 tablets on 2/9/2017  9:08 AM                                * Notice:  This list has 5 medication(s) that are the same as other medications prescribed for you. Read the directions carefully, and ask your doctor or other care provider to review them with you.

## 2017-02-06 NOTE — ANESTHESIA POSTPROCEDURE EVALUATION
Patient: Julisa Champion    Procedure(s):  Stealth Assisted Extension Of Fusion Cervical 7/Thoracic 1-Thoracic 2, Bautista Sanchez Osteotomy Thoracic 1-2, Interbody Fusion Thoracic 1-2  - Wound Class: I-Clean    Diagnosis:Thoracic 1-2 Subluxation  Diagnosis Additional Information: No value filed.    Anesthesia Type:  General    Note:  Anesthesia Post Evaluation    Patient location during evaluation: PACU  Patient participation: Able to fully participate in evaluation  Level of consciousness: awake and alert  Pain management: adequate  Airway patency: patent  Cardiovascular status: acceptable  Respiratory status: acceptable  Hydration status: acceptable  PONV: none     Anesthetic complications: None          Last vitals:  Filed Vitals:    02/06/17 1049   BP: 165/98   Pulse: 95   Temp: 36.8  C (98.2  F)   Resp: 16   SpO2: 96%         Electronically Signed By: Regis Huynh MD, MD  February 6, 2017  3:14 PM

## 2017-02-07 ENCOUNTER — APPOINTMENT (OUTPATIENT)
Dept: PHYSICAL THERAPY | Facility: CLINIC | Age: 67
DRG: 457 | End: 2017-02-07
Attending: ORTHOPAEDIC SURGERY
Payer: MEDICARE

## 2017-02-07 LAB
ANION GAP SERPL CALCULATED.3IONS-SCNC: 6 MMOL/L (ref 3–14)
BUN SERPL-MCNC: 11 MG/DL (ref 7–30)
CALCIUM SERPL-MCNC: 8 MG/DL (ref 8.5–10.1)
CHLORIDE SERPL-SCNC: 109 MMOL/L (ref 94–109)
CO2 SERPL-SCNC: 26 MMOL/L (ref 20–32)
CREAT SERPL-MCNC: 0.81 MG/DL (ref 0.52–1.04)
ERYTHROCYTE [DISTWIDTH] IN BLOOD BY AUTOMATED COUNT: 13.9 % (ref 10–15)
GFR SERPL CREATININE-BSD FRML MDRD: 71 ML/MIN/1.7M2
GLUCOSE SERPL-MCNC: 115 MG/DL (ref 70–99)
HCT VFR BLD AUTO: 32.2 % (ref 35–47)
HGB BLD-MCNC: 10.1 G/DL (ref 11.7–15.7)
MAGNESIUM SERPL-MCNC: 2 MG/DL (ref 1.6–2.3)
MCH RBC QN AUTO: 29.5 PG (ref 26.5–33)
MCHC RBC AUTO-ENTMCNC: 31.4 G/DL (ref 31.5–36.5)
MCV RBC AUTO: 94 FL (ref 78–100)
MRSA DNA SPEC QL NAA+PROBE: ABNORMAL
PHOSPHATE SERPL-MCNC: 4 MG/DL (ref 2.5–4.5)
PLATELET # BLD AUTO: 194 10E9/L (ref 150–450)
POTASSIUM SERPL-SCNC: 4.4 MMOL/L (ref 3.4–5.3)
RBC # BLD AUTO: 3.42 10E12/L (ref 3.8–5.2)
SODIUM SERPL-SCNC: 141 MMOL/L (ref 133–144)
SPECIMEN SOURCE: ABNORMAL
WBC # BLD AUTO: 8.2 10E9/L (ref 4–11)

## 2017-02-07 PROCEDURE — 83735 ASSAY OF MAGNESIUM: CPT | Performed by: ORTHOPAEDIC SURGERY

## 2017-02-07 PROCEDURE — 85027 COMPLETE CBC AUTOMATED: CPT | Performed by: ORTHOPAEDIC SURGERY

## 2017-02-07 PROCEDURE — 80048 BASIC METABOLIC PNL TOTAL CA: CPT | Performed by: ORTHOPAEDIC SURGERY

## 2017-02-07 PROCEDURE — 94640 AIRWAY INHALATION TREATMENT: CPT | Mod: 76

## 2017-02-07 PROCEDURE — 25000132 ZZH RX MED GY IP 250 OP 250 PS 637: Mod: GY | Performed by: INTERNAL MEDICINE

## 2017-02-07 PROCEDURE — 25000132 ZZH RX MED GY IP 250 OP 250 PS 637: Mod: GY | Performed by: PHYSICIAN ASSISTANT

## 2017-02-07 PROCEDURE — 97530 THERAPEUTIC ACTIVITIES: CPT | Mod: GP | Performed by: PHYSICAL THERAPIST

## 2017-02-07 PROCEDURE — A9270 NON-COVERED ITEM OR SERVICE: HCPCS | Mod: GY | Performed by: ORTHOPAEDIC SURGERY

## 2017-02-07 PROCEDURE — 25000128 H RX IP 250 OP 636: Performed by: INTERNAL MEDICINE

## 2017-02-07 PROCEDURE — 99233 SBSQ HOSP IP/OBS HIGH 50: CPT | Performed by: PHYSICIAN ASSISTANT

## 2017-02-07 PROCEDURE — A9270 NON-COVERED ITEM OR SERVICE: HCPCS | Mod: GY | Performed by: INTERNAL MEDICINE

## 2017-02-07 PROCEDURE — 84100 ASSAY OF PHOSPHORUS: CPT | Performed by: ORTHOPAEDIC SURGERY

## 2017-02-07 PROCEDURE — 97116 GAIT TRAINING THERAPY: CPT | Mod: GP | Performed by: PHYSICAL THERAPIST

## 2017-02-07 PROCEDURE — 97161 PT EVAL LOW COMPLEX 20 MIN: CPT | Mod: GP | Performed by: PHYSICAL THERAPIST

## 2017-02-07 PROCEDURE — 94640 AIRWAY INHALATION TREATMENT: CPT

## 2017-02-07 PROCEDURE — 25000128 H RX IP 250 OP 636: Performed by: ORTHOPAEDIC SURGERY

## 2017-02-07 PROCEDURE — 25000308 HC RX OP HPI UCR WEL MED 250 IP 250

## 2017-02-07 PROCEDURE — 40000193 ZZH STATISTIC PT WARD VISIT: Performed by: PHYSICAL THERAPIST

## 2017-02-07 PROCEDURE — L0174 CERV SR 2PC THOR EXT PRE OTS: HCPCS

## 2017-02-07 PROCEDURE — 25000132 ZZH RX MED GY IP 250 OP 250 PS 637: Mod: GY | Performed by: ORTHOPAEDIC SURGERY

## 2017-02-07 PROCEDURE — A9270 NON-COVERED ITEM OR SERVICE: HCPCS | Mod: GY | Performed by: PHYSICIAN ASSISTANT

## 2017-02-07 PROCEDURE — 12000008 ZZH R&B INTERMEDIATE UMMC

## 2017-02-07 RX ORDER — ALBUTEROL SULFATE 5 MG/ML
2.5 SOLUTION RESPIRATORY (INHALATION)
Status: DISCONTINUED | OUTPATIENT
Start: 2017-02-07 | End: 2017-02-08

## 2017-02-07 RX ORDER — ALBUTEROL SULFATE 0.83 MG/ML
SOLUTION RESPIRATORY (INHALATION)
Status: COMPLETED
Start: 2017-02-07 | End: 2017-02-07

## 2017-02-07 RX ORDER — OXYCODONE HYDROCHLORIDE 5 MG/1
5-10 TABLET ORAL
Status: DISCONTINUED | OUTPATIENT
Start: 2017-02-07 | End: 2017-02-10 | Stop reason: HOSPADM

## 2017-02-07 RX ORDER — BISACODYL 10 MG
10 SUPPOSITORY, RECTAL RECTAL DAILY PRN
Status: DISCONTINUED | OUTPATIENT
Start: 2017-02-07 | End: 2017-02-10 | Stop reason: HOSPADM

## 2017-02-07 RX ORDER — SODIUM CHLORIDE 9 MG/ML
INJECTION, SOLUTION INTRAVENOUS
Status: DISPENSED
Start: 2017-02-07 | End: 2017-02-08

## 2017-02-07 RX ORDER — LISINOPRIL 10 MG/1
20 TABLET ORAL DAILY
Status: DISCONTINUED | OUTPATIENT
Start: 2017-02-07 | End: 2017-02-07

## 2017-02-07 RX ORDER — GUAIFENESIN 600 MG/1
600 TABLET, EXTENDED RELEASE ORAL 2 TIMES DAILY
Status: DISCONTINUED | OUTPATIENT
Start: 2017-02-07 | End: 2017-02-10 | Stop reason: HOSPADM

## 2017-02-07 RX ADMIN — SENNOSIDES AND DOCUSATE SODIUM 1 TABLET: 8.6; 5 TABLET ORAL at 08:23

## 2017-02-07 RX ADMIN — ACETAMINOPHEN 975 MG: 325 TABLET ORAL at 12:07

## 2017-02-07 RX ADMIN — GABAPENTIN 100 MG: 100 CAPSULE ORAL at 15:35

## 2017-02-07 RX ADMIN — GABAPENTIN 100 MG: 100 CAPSULE ORAL at 08:23

## 2017-02-07 RX ADMIN — DIAZEPAM 5 MG: 5 TABLET ORAL at 12:50

## 2017-02-07 RX ADMIN — DIAZEPAM 5 MG: 5 TABLET ORAL at 19:54

## 2017-02-07 RX ADMIN — POLYETHYLENE GLYCOL 3350 17 G: 17 POWDER, FOR SOLUTION ORAL at 08:22

## 2017-02-07 RX ADMIN — CEFAZOLIN SODIUM 1 G: 1 INJECTION, POWDER, FOR SOLUTION INTRAMUSCULAR; INTRAVENOUS at 00:02

## 2017-02-07 RX ADMIN — ACETAMINOPHEN 975 MG: 325 TABLET ORAL at 03:10

## 2017-02-07 RX ADMIN — SENNOSIDES AND DOCUSATE SODIUM 2 TABLET: 8.6; 5 TABLET ORAL at 19:55

## 2017-02-07 RX ADMIN — ACETAMINOPHEN 650 MG: 325 TABLET ORAL at 08:23

## 2017-02-07 RX ADMIN — SODIUM CHLORIDE: 9 INJECTION, SOLUTION INTRAVENOUS at 03:22

## 2017-02-07 RX ADMIN — ALBUTEROL SULFATE 2.5 MG: 2.5 SOLUTION RESPIRATORY (INHALATION) at 22:36

## 2017-02-07 RX ADMIN — CEFAZOLIN SODIUM 1 G: 1 INJECTION, POWDER, FOR SOLUTION INTRAMUSCULAR; INTRAVENOUS at 08:22

## 2017-02-07 RX ADMIN — GUAIFENESIN 600 MG: 600 TABLET, EXTENDED RELEASE ORAL at 19:54

## 2017-02-07 RX ADMIN — SODIUM CHLORIDE 500 ML: 9 INJECTION, SOLUTION INTRAVENOUS at 20:14

## 2017-02-07 RX ADMIN — VENLAFAXINE 225 MG: 75 TABLET ORAL at 19:53

## 2017-02-07 RX ADMIN — ACETAMINOPHEN 325 MG: 325 TABLET ORAL at 19:53

## 2017-02-07 RX ADMIN — SODIUM CHLORIDE 500 ML: 9 INJECTION, SOLUTION INTRAVENOUS at 16:25

## 2017-02-07 RX ADMIN — GABAPENTIN 100 MG: 100 CAPSULE ORAL at 19:54

## 2017-02-07 RX ADMIN — LISINOPRIL 20 MG: 10 TABLET ORAL at 08:23

## 2017-02-07 RX ADMIN — NORTRIPTYLINE HYDROCHLORIDE 10 MG: 10 CAPSULE ORAL at 21:52

## 2017-02-07 ASSESSMENT — PAIN DESCRIPTION - DESCRIPTORS: DESCRIPTORS: ACHING;DULL

## 2017-02-07 NOTE — PLAN OF CARE
Problem: Goal Outcome Summary  Goal: Goal Outcome Summary  PT-8A: PT eval & treat orders received. Patient just returned to bed after being up with nursing staff for 2 hours. Have rescheduled PT eval to the pm.

## 2017-02-07 NOTE — PROGRESS NOTES
S: Patient was seen today at [facility] -  ICU for an eval for a cervical collar as ordered by Dr. Wolfe    O/G: The objective/goal of the collar is to help reduce motion/give cervical stability.    A: Pt was fit with a Miami J cervical collar, size super short.  Starting with the anterior section, the correct size and height was chosen that supported the patient in the desired position.  Attention was given to the chin support being sure that the correct height was selected to support the chin.   The posterior section was centered on the patients head.  With the side closure straps extending equally on both sides, the Velcro was secured.  An extra padding set was also provided.  Patient instructed on donning, doffing, use and care.  Patient advised to wear the collar at all times except hygiene per physician order.      P: Patient has been instructed to contact our facility with any questions and/or concerns.

## 2017-02-07 NOTE — PROGRESS NOTES
Medicine - transferred to [facility] today from ICU.  Discussed with critical care provider.  Received lisinopril 20 mg this AM.  Noted mild hypotension with SBP 90's per RN.  Ambulated without LH.  Recheck BP 95 systolic.  No known HD.  Difficulty clearing upper airway secretions.    A/P:  Mild hypotension likely multifactorial 2nd to dilaudid pca, decrease volume and lisinopril.  Bolus 500 cc's. NS. DC lisinopril.   AM BMP.  Mucinex to help mobilize secretions.

## 2017-02-07 NOTE — BRIEF OP NOTE
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  Procedure: Procedure(s):  Stealth Assisted Extension Of Fusion Thoracic 1-Thoracic 2, Bautista Sanchez Osteotomy Thoracic 1-2, Interbody Fusion Thoracic 1-2  - Wound Class: I-Clean  Surgeon: Surgeon(s) and Role:     * Isauro Wolfe MD - Primary     * Zulay Morocho MD - Assisting     * Logan Martini MD - Resident - Assisting  Anesthesia: General   Estimated blood loss: 200 ml  Drains: Hemovac  Specimens: * No specimens in log *  Findings:   Proximal junctional kyphosis at T1-T2, see dictated op note for details. .  Complications: None.  Implants: Pedicle screws at T1-T2, apical anna connectors, rods.       Assessment/Plan: Julisa Champion is a 66 year old female s/p stealth assisted extension of PISF to T1-T2, SPO at T1-T2, interbody fusion at T1-T2 on 2/6/2017 with Dr. Wolfe and Dr. Morocho.    Activity: Up with assist. At minimum, patient must be up to a chair on POD 1. To bending, twisting, or lifting > 10 pounds. Cervical collar at all times except for hygiene. No head of bed restrictions.  Weight bearing status: WBAT BUE and BLE.  Antibiotics: Ancef x 24 hours.  Diet: Begin with clear fluids and progress diet as tolerated.   DVT prophylaxis: Ambulatory. SCDs.  Bracing/Splinting: Soft cervical collar at all times, Will transition to \[facility]\"" on POD 1 with Orthotics assistance.  Wound Care: Aquacel dressing x 7 days, reinforce PRN.  Drains: Document output per shift, discontinue when <30 cc/shift.   Pain management: transition from IV to orals as tolerated.    X-rays: Standing AP/Lat full spine x-rays prior to discharge.   Physical Therapy: Mobilization, ROM, ADL's.   Occupational Therapy: ADL's  Labs: Trend Hgb on POD #1, 2, 3.    Consults: PT, OT. ICU, Hospitalist, Orthotics, appreciate assistance in caring for this patient.   Follow-up: Clinic with Dr. Wolfe as follows:   Future Appointments  Date Time Provider Department Center   2/7/2017 11:30 AM Thu Galindo, PT [facility] [place]   2/7/2017 3:00 PM Thu Galindo, PT [facility] [place]   2/8/2017 8:00 AM Tete Wheeler OT UROT [place]   3/23/2017 11:45 AM Isauro Wolfe MD [facility]   4/20/2017 10:30 AM Isauro Wolfe MD [facility]       Disposition: Pending progress with therapies, pain control on orals, and medical stability, anticipate discharge to Home on POD #2-4.      Logan Martini MD 2/6/2017 6:30 PM  Orthopaedic Surgery Resident, PGY-4  Pager: [phone]    For questions about this patient, please attempt to contact me at my pager prior to contacting the orthopaedics resident on call. Thank you!

## 2017-02-07 NOTE — ANESTHESIA CARE TRANSFER NOTE
Patient: Julisa Champion    Procedure(s):  Stealth Assisted Extension Of Fusion Thoracic 1-Thoracic 2, Bautista Sanchez Osteotomy Thoracic 1-2, Interbody Fusion Thoracic 1-2  - Wound Class: I-Clean    Diagnosis: Thoracic 1-2 Subluxation  Diagnosis Additional Information: No value filed.    Anesthesia Type:   General     Note:  Airway :Face Mask  Patient transferred to:PACU  Comments: Patient is awake, slightly disoriented, moves all extremities spontaeously. VSS, normothermic. C/O pain, fentanyl titrated with slight relief, c/o itchy face and thirst. IV is patent. Report to RN.      Vitals: (Last set prior to Anesthesia Care Transfer)    CRNA VITALS  2/6/2017 1805 - 2/6/2017 1850      2/6/2017             Pulse: 96    ART BP: 169/88 mmHg    ART Mean: 123    Temp: (!) 20.8  C (69.4  F)    SpO2: 98 %    Resp Rate (observed): 15    Resp Rate (set): 10    EKG: Sinus tachycardia                Electronically Signed By: JOSE JUAN Hung CRNA  February 6, 2017  6:50 PM

## 2017-02-07 NOTE — PLAN OF CARE
Problem: Goal Outcome Summary  Goal: Goal Outcome Summary  Outcome: Improving  S: Received from PACU 1930. Hypertensive which resolved with home BP med. MAP >80. States pain adequately controlled with PCA. No dysphagia or neck swelling. Blanchable erythema chest, abd, thighs resolved. Doing well with IS. Does drop sats when sleeping unless on NC. H&P noted risk CT due to BMI and snoring.  B: S/p lami thoracic.  A: Improving.  R: D/c raquel. 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 Transition to po pain meds and transfer to floor. Continue cervical collar until [place] J fitted by orthotics.

## 2017-02-07 NOTE — PLAN OF CARE
Problem: Goal Outcome Summary  Goal: Goal Outcome Summary  PT: Evaluation complete, treatment initiated. Pt tx supine>sit at EOB with SBA and sit>supine with min A from spouse. Pt tx sit<>stand with UE support and CGA-min A pending surface height. Pt amb with SBA and use of 4WW and negotiated 3 stairs CGA. Recommend disch home with assist from spouse once medically appropriate, and HHPT for home safety evaluation. Spouse states that he assisted pt with mobility at baseline, and will continue to provide assist as needed.

## 2017-02-07 NOTE — H&P
History and Physical     Julisa Champion MRN# [id number]   YOB: 1950 Age: 66 year old      Date of Admission:  2/6/2017      Assessment/ Plan:    Julisa Champion is a 66 year old female s/p stealth assisted extension of PISF to T1-T2, SPO at T1-T2, interbody fusion at T1-T2 on 2/6/2017 with Dr. Wolfe and Dr. Morocho. EBL: 200 ml. No other significant complications.     Patient admitted to ICU for close post op monitoring.   Currently doing well.    PMH, pre op eval reviewed.   Medical history significant for:  Hypertension, obesity, restless leg syndrome, constipation, and anxiety.      # Hemodynamics stable, actually bp high. B/P: 181/96, T: 98, P: 95, R: 11. Per RN, Dr Wolfe recommended to keep MAP>80.   - Monitor vitals closely. Has art line.   - Tele monitor.   - IVF:  cc/hour.   - I/O.   * ICU admit order set placed.     # CVS: HTN: hold home lisinopril hctz for now. Hydralazine 10 mg iv Q 6 hr prn for sbp>180. Pain control.   - Monitor.     # Pulmonary: no hx of CT or other significant pulmonary hx.   - Encourage IS.   - Pulmonary toilet prn.   - Pulse Ox continuous.     # GI: Antiemetics prn. H/o constipation. Aggressive Bowel regimen including senna colace, miralax.     # Neuro:   H/o Post-op delirium, confusion, and hallucinations after her last spinal fusion surgery this past August. Will be high risk for post-op delirium.  -  Consider avoiding benzos as able and limiting narcotics.   - Delirium precautions.     RLS: continue home Nortryptiline.     Anxiety: continue home Effexor.     Other per Ortho:     Activity: Up with assist. At minimum, patient must be up to a chair on POD 1. To bending, twisting, or lifting > 10 pounds. Cervical collar at all times except for hygiene. No head of bed restrictions.  Weight bearing status: WBAT BUE and BLE.  Antibiotics: Ancef x 24 hours.  Diet: Begin with clear fluids and progress diet as tolerated.    DVT prophylaxis: Ambulatory.  SCDs.  Bracing/Splinting: Soft cervical collar at all times, Will transition to [place] J on POD 1 with Orthotics assistance.  Wound Care: Aquacel dressing x 7 days, reinforce PRN.  Drains: Document output per shift, discontinue when <30 cc/shift.    Pain management: transition from IV to orals as tolerated.    X-rays: Standing AP/Lat full spine x-rays prior to discharge.   Physical Therapy: Mobilization, ROM, ADL's.   Occupational Therapy: ADL's  Labs: Trend Hgb on POD #1, 2, 3.   Consults: PT, OT. ICU, Hospitalist, Orthotics, appreciate assistance in caring for this patient.    Follow-up: Clinic with Dr. Wolfe as follows:     Bahman RN.   Will d/w ICU staff Dr Luong.       MD Sw[facility]a [facility]The [facility]  2/6/2017        CC: Post op, doing well.       HPI: Obtained from the patient, chart review.     Julisa Champion is a 66 year old female with hypertension, obesity, restless leg syndrome, constipation, upper back pain, and anxiety that has been having a complication of upper back pain since having a spinal fusion procedure on 8/15/2016 for scoliosis.  It was determined that she now has T1 and T2 subluxations and underwent below listed procedure by Dr Wolfe.     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  Procedure:      Procedure(s):  Stealth Assisted Extension Of Fusion Thoracic 1-Thoracic 2, Bautista Sanchez Osteotomy Thoracic 1-2, Interbody Fusion Thoracic 1-2  - Wound Class: I-Clean  Surgeon:         Surgeon(s) and Role:     * Isauro Wolfe MD - Primary     * Zulay Morocho MD - Assisting     * Logan Martini MD - Resident - Assisting  Anesthesia:     General             Estimated blood loss: 200 ml  Drains:Hemovac  Specimens:     * No specimens in log *  Findings:                     Proximal junctional kyphosis at  T1-T2, see dictated op note for details. .  Complications:            None.  Implants: Pedicle screws at T1-T2, apical anna connectors, rods.     Post op doing well. At current Pain fairly controlled on current regimen, notes mild to moderate discomfort . Tired. Denies numbness or tingling to bilateral hands/upper extremities or legs. Denies chest pain, shortness of breath, nausea, or vomiting. No other issues or concerns at present.   Patient PMH reviewed as above.   Has gay catheter. Arterial line.       Past Medical History:  Past Medical History   Diagnosis Date     Hypertension      History of spinal fusion      for scoliosis     History of blood transfusion      Constipation      Back pain      Subluxation of thoracic vertebra      Obesity      Anxiety      Restless leg syndrome        Past Surgical History:  Past Surgical History   Procedure Laterality Date     Gastric bypass  2002     Orthopedic surgery       bilateral knee replacements     Hysterectomy       [facility] BSO     Optical tracking system fusion posterior spine thoracic three+ levels N/A 8/15/2016     Procedure: OPTICAL TRACKING SYSTEM FUSION POSTERIOR SPINE THORACIC THREE+ LEVELS;  Surgeon: Isauro Wolfe MD;  Location: [facility] OR       Allergies:     Allergies   Allergen Reactions     Blood Transfusion Related (Informational Only) Other (See Comments)     Patient has a history of a clinically significant antibody against RBC antigens.  A delay in compatible RBCs may occur.       Medications:    No current facility-administered medications on file prior to encounter.  Current Outpatient Prescriptions on File Prior to Encounter:  traMADol (ULTRAM) 50 MG tablet Take 1 tablet (50 mg) by mouth every 6 hours as needed for moderate pain   nortriptyline (PAMELOR) 10 MG capsule Take 1 capsule (10 mg) by mouth At Bedtime   lisinopril-hydrochlorothiazide (PRINZIDE,ZESTORETIC) 20-12.5 MG per tablet Take 1 tablet by mouth daily   polyethylene glycol  "(MIRALAX/GLYCOLAX) packet Take 17 g by mouth daily as needed for constipation   senna-docusate (SENOKOT-S;PERICOLACE) 8.6-50 MG per tablet Take 1-2 tablets by mouth 2 times daily as needed for constipation   methocarbamol (ROBAXIN) 750 MG tablet Take 1 tablet (750 mg) by mouth 4 times daily as needed for muscle spasms   acetaminophen (TYLENOL) 500 MG tablet Take 2 tablets (1,000 mg) by mouth every 6 hours as needed for mild pain or fever   Venlafaxine HCl (EFFEXOR PO) Take 225 mg by mouth every evening    order for DME Equipment being ordered: Walker Wheels () and Walker ()Treatment Diagnosis: Gait instability       Social History:  Social History     Social History     Marital Status:      Spouse Name: Christian     Number of Children: 3     Years of Education: N/A     Occupational History     retired RN      Social History Main Topics     Smoking status: Never Smoker      Smokeless tobacco: Not on file     Alcohol Use: 0.0 oz/week     0 Standard drinks or equivalent per week      Comment: one cocktail a month when out for dinner     Drug Use: No     Sexual Activity: Not on file     Other Topics Concern     Caffeine Concern Yes     1 can of soda a day      Special Diet No     Exercise No     not much due to pain     Social History Narrative       Family History:   Family History   Problem Relation Age of Onset     Colon Cancer Father      Other Cancer Mother      unsure of primary, but mets to kidney and bone     Myocardial Infarction Father      Coronary Artery Disease Father      Chromosome Abnormality Daughter      trisomy 18         ROS:  The remainder of the complete ROS was negative unless noted in the HPI.      Exam:    /96 mmHg  Pulse 95  Temp(Src) 98  F (36.7  C) (Oral)  Resp 8  Ht 1.651 m (5' 5\")  Wt 110 kg (242 lb 8.1 oz)  BMI 40.36 kg/m2  SpO2 94%    Temp (24hrs), Av.1  F (36.7  C), Min:98  F (36.7  C), Max:98.2  F (36.8  C)      Wt Readings from Last 5 Encounters: "   17 110 kg (242 lb 8.1 oz)   17 108.228 kg (238 lb 9.6 oz)   17 109.317 kg (241 lb)   16 108.41 kg (239 lb)   08/15/16 100 kg (220 lb 7.4 oz)       General: Alert, interactive, NAD  HEENT: AT/NC, sclera anicteric, PERRL, EOMI, moist MM  Neck: neck collar.   Resp/chest: clear to auscultation bilaterally, no crackles or wheezes  Cardiac/Heart: s1s2 regular rate and rhythm, no murmur  GI/Abdomen: Soft, nontender, nondistended.  No rebound or guarding.  MSK/Extremities: No LE edema, distal pulses 2+  Skin: Warm and dry, no jaundice or rash on exposed areas.   Neuro: Alert & oriented x 3, Cns 2-12 intact, able to move UE fine, wiggle bl toes.   Psychiatry: Pleasant.     Labs:    BMPNo lab results found in last 7 days.  CBCNo lab results found in last 7 days.  INRNo lab results found in last 7 days.  LFTsNo lab results found in last 7 days.   PANCNo lab results found in last 7 days.    Imaging:      [id number]  NSR  Cardiac echo: 2016  Interpretation Summary  No significant valvular abnormalities were noted. No pathologic basis for  murmur identified. Technically difficult study.Extremely poor acoustic  windows.  Global and regional left ventricular function is hyperkinetic with an EF of  65-70%.  Right ventricular function, chamber size, wall motion, and thickness are  normal.

## 2017-02-07 NOTE — PROGRESS NOTES
02/07/17 1539   Living Environment   Lives With spouse   Living Arrangements house   Home Accessibility stairs (1 railing present);bed and bath on same level   Number of Stairs to Enter Home 3   Number of Stairs Within Home 0   Stair Railings at Home outside, present on right side   Transportation Available car;family or friend will provide   Living Environment Comment Pt lives with spouse in house with 3 stairs to enter and all needs within first floor. Pt has raised toilet seat, no grab bars. Shower is walk-in.    Self-Care   Dominant Hand right   Usual Activity Tolerance moderate   Current Activity Tolerance moderate   Regular Exercise no   Equipment Currently Used at Home walker, rolling   Activity/Exercise/Self-Care Comment Pt states that she was mobilizing with assist from  prior to admission. pt states owning 4WW, but denies regular use. With no grab bars, pt does endorse occasional use of locked 4WW for UE support at home.   Functional Level Prior   Ambulation 2-->assistive person   Transferring 1-->assistive equipment   Toileting 0-->independent   Fall history within last six months no   Which of the above functional risks had a recent onset or change? none;ambulation;transferring   Prior Functional Level Comment Pt amb with 4WW with in community for prolonged distances. Otherwise, pt IND within home or using husbands arm for stability with gait.    General Information   Onset of Illness/Injury or Date of Surgery - Date 02/06/17   Referring Physician Isauro Wolfe MD   Patient/Family Goals Statement Get home   Pertinent History of Current Problem (include personal factors and/or comorbidities that impact the POC) POD 1 T1-T2 transforaminal interbody fusion, extension of fusion to T1   Precautions/Limitations spinal precautions;fall precautions   Weight-Bearing Status - LUE full weight-bearing  (> 10#)   Weight-Bearing Status - RUE full weight-bearing  (< 10#)   Weight-Bearing Status - LLE  weight-bearing as tolerated   Weight-Bearing Status - RLE weight-bearing as tolerated   Heart Disease Risk Factors Lack of physical activity;Overweight;Age   General Observations gay catheter; hemovac; cervical collar   General Info Comments Activity order: amb   Cognitive Status Examination   Orientation orientation to person, place and time   Level of Consciousness alert   Follows Commands and Answers Questions 100% of the time   Personal Safety and Judgment intact   Memory intact   Pain Assessment   Patient Currently in Pain No   Integumentary/Edema   Integumentary/Edema no deficits were identifed   Posture    Posture Not impaired   Range of Motion (ROM)   ROM Comment BLE AROM WFL per functional mobility assessment   Strength   Strength Comments No formal assessment, per functional mobility WFL. Some proximal weakness noted with mild difficulty transferring from lower surfaces or negotiating 8'' steps.   Bed Mobility   Bed Mobility Comments Pt tx supine>sit within precautions with SBA   Transfer Skills   Transfer Comments Pt tx sit<>stand at bedside with CGA and 4WW   Gait   Gait Comments Pt amb 10 feet to bathroom with FWW and CGA, no LOB noted.   Balance   Balance Comments Pt stood with 1 UE support at 4 WW while gown and gait belt donned, no LOB or increased sway.   Sensory Examination   Sensory Perception Comments No impairments identified.   General Therapy Interventions   Planned Therapy Interventions balance training;gait training;bed mobility training;strengthening;transfer training;home program guidelines;progressive activity/exercise   Clinical Impression   Criteria for Skilled Therapeutic Intervention yes, treatment indicated   PT Diagnosis Impaired functional mobility   Influenced by the following impairments pain, decreased strength, post-operative precautions   Functional limitations due to impairments decreased IND with transfers and community amb   Clinical Presentation Stable/Uncomplicated  "  Clinical Presentation Rationale Pt progressing well with mobility s/p T1-2 fusion. Pt mobilizing near baseline status per pt and spouse report.   Clinical Decision Making (Complexity) Low complexity   Therapy Frequency` 2 times/day   Predicted Duration of Therapy Intervention (days/wks) 2 days   Anticipated Discharge Disposition Home with Assist;Home with Home Therapy   Risk & Benefits of therapy have been explained Yes   Patient, Family & other staff in agreement with plan of care Yes   [facility] [employer] \"6 Clicks\"   2016, Trustees of [facility], under license to [employer].  All rights reserved.   6 Clicks Short Forms Basic Mobility Inpatient Short Form   [facility] AM-PAC  \"6 Clicks\" V.2 Basic Mobility Inpatient Short Form   1. Turning from your back to your side while in a flat bed without using bedrails? 4 - None   2. Moving from lying on your back to sitting on the side of a flat bed without using bedrails? 4 - None   3. Moving to and from a bed to a chair (including a wheelchair)? 4 - None   4. Standing up from a chair using your arms (e.g., wheelchair, or bedside chair)? 3 - A Little   5. To walk in hospital room? 3 - A Little   6. Climbing 3-5 steps with a railing? 3 - A Little   Basic Mobility Raw Score (Score out of 24.Lower scores equate to lower levels of function) 21   Total Evaluation Time   Total Evaluation Time (Minutes) 6     "

## 2017-02-07 NOTE — PROGRESS NOTES
[facility], [place]    Integrated Faculty Critical Care (IF) Service  Progress Note    Date of Service (when I saw the patient): 02/07/2017     Assessment and Plan  Julisa Champion is a 66 year old female s/p stealth assisted extension of PISF to T1-T2, SPO at T1-T2, interbody fusion at T1-T2 on 2/6/2017 with Dr. Osei and Dr. Morocho. EBL: 200 ml. No other significant complications.     S/P spinal surgery. Hx of scoliosis   - Wound cares per Dr osei. No HOB restrictions. BREE drain management per orthopedics.   - Cervical collar at all times  - orthotics to fit with [place] J collar.     Hypertension  -   lisinopril resumed holding hctz for now.   - Hydralazine 10 mg iv Q 6 hr prn for sbp>180. Pain control.    - Monitor.      Pulmonary: no hx of CT or other significant pulmonary hx.    - Encourage IS.  Pulmonary toilet  - Pulse Ox continuous. Wean oxygen as able   - no prior use of CPAP or BIPAP PTA    H/o constipation.   - Aggressive Bowel regimen including senna colace, miralax.       H/o Post-op delirium, confusion, and hallucinations after her last spinal fusion surgery this past August.   - delirium prevention,  Will be high risk for post-op delirium.  -  Consider avoiding benzos as able and limiting narcotics.     RLS  -  continue home Nortryptiline.     Anxiety  - continue home Effexor.     Acute post operative pain   - PCA for now, transition to oral medications when pt taking PO reliably. Pain controlled with current regimen.   - ICE cooling pad     GENERAL CARES  DVT Prophylaxis: Pneumatic Compression Devices  GI Prophylaxis: PPI  Restraints: Restraints for medical healing needed: NO  Family update by me today: Yes   Current lines are required for patient management  Access: Jey Navarro    Time Spent on This Encounter  Billing:  I spent 45 minutes bedside and on the inpatient unit today managing thecare of Julisa Champion in relation to the issues listed in  this note.    Main Plans for Today  d/c arterial line   Transfer to floor okay, discussed with charge RN on 10A and Dr osei  signout given to Dr Taylor from .    OOB today   D/c gay     Interval History Course reviewed. No acute events overnight. Pain controlled with PCA. Denies spasms. Denies fever, chills,sweats, nausea or vomiting. Denies decreased or double vision. Feels thirsty. Denies trouble swallowing or pain with swallowing.     Physical Exam  Temp: 97.6  F (36.4  C) Temp src: Axillary Temp  Min: 97.6  F (36.4  C)  Max: 98.2  F (36.8  C) BP: (!) 181/96 mmHg Pulse: 95 Heart Rate: 72 Resp: 10 SpO2: 98 % O2 Device: Nasal cannula Oxygen Delivery: 2 LPM  Filed Vitals:    02/06/17 1049 02/06/17 1949   Weight: 109 kg (240 lb 4.8 oz) 110 kg (242 lb 8.1 oz)     I/O last 3 completed shifts:  In: 3885.41 [P.O.:380; I.V.:3120.41; Other:135]  Out: 1210 [Urine:910; Drains:50; Blood:250]    GEN: laying in bed, pleasant and conversant   EYES: PERRL, pupils 3mm and equal. No periorbital edema.  Anicteric sclera.   HEENT:  Normocephalic, atraumatic, trachea midline, OP clear. Soft foam collar in place. Hemovac with serosanguinous drainage.   CV: RRR, S1, S2, no gallops, rubs, or murmurs  PULM/CHEST: Clear breath sounds bilaterally without rhonchi, crackles or wheeze, symmetric chest rise  GI: normal bowel sounds, soft, non-tender, no rebound tenderness or guarding, no masses  : gay catheter in place, urine yellow and clear  EXTREMITIES: no peripheral edema, moving all extremities,  strength equal and symmetricperipheral pulses intact 2+   NEURO: Cranial nerves II-XII grossly intact, no motor-sensory deficits noted  SKIN: No rashes, sores or ulcerations noted. Small scalp swelling at site of prior electrodes during OR--no active bleeding.     Imaging personally reviewed:  ECG: NSR per monitor     Medications    NaCl 125 mL/hr at 02/07/17 0322       lisinopril  20 mg Oral Daily     nortriptyline  10 mg Oral At  Bedtime     venlafaxine (EFFEXOR) tablet 225 mg  225 mg Oral QPM     sodium chloride (PF)  3 mL Intracatheter Q8H     ceFAZolin  1 g Intravenous Q8H     gabapentin  100 mg Oral TID     senna-docusate  1-2 tablet Oral BID     polyethylene glycol  17 g Oral Daily     acetaminophen  975 mg Oral Q8H     HYDROmorphone   Intravenous PCA     influenza Vac Split High-Dose  0.5 mL Intramuscular Prior to discharge       Data    Recent Labs  Lab 02/07/17  0320   WBC 8.2   HGB 10.1*   MCV 94         POTASSIUM 4.4   CHLORIDE 109   CO2 26   BUN 11   CR 0.81   ANIONGAP 6   NELLIE 8.0*   *     Recent Results (from the past 24 hour(s))   XR Surgery LINDA Fluoro L/T 5 Min    Narrative    This exam was marked as non-reportable because it will not be read by a   radiologist or a [place] non-radiologist provider.

## 2017-02-07 NOTE — PROGRESS NOTES
Orthopaedic Surgery Progress Note 2/6/2017    E: No acute events post op. Extubated. Seen in PACU. Stable condition. MAP > 80.     S: Pain fairly controlled on current regimen, notes moderate discomfort now, still awaiting on Dilaudid PCA. Denies numbness or tingling to bilateral hands/upper extremities or legs. Denies chest pain, shortness of breath, nausea, or vomiting. No other issues or concerns at present.     O:  Filed Vitals:    02/06/17 1837 02/06/17 1845 02/06/17 1900 02/06/17 1915   BP: 180/102 165/109 153/93 161/91   Pulse:       Temp: 98.1  F (36.7  C)   98.1  F (36.7  C)   TempSrc: Axillary   Axillary   Resp: 12 16 19 13   Height:       Weight:       SpO2: 95% 95% 94% 93%         Exam:  Gen: No acute distress, resting comfortably in bed.  Resp: Non-labored breathing on room air.   Back: Aquacel dressing in place, Hemovac tubing in place with scant bloody drainage in canister.   BUE:  - 4/5 EPL  - 4/5 IO  - 5/5   - 5/5 Biceps  - 4+/5 Triceps  - SILT in med/rad/uln/ax.  - Hands warm and well perfused.  BLE:  - 5/5 EHL/FHL/TA/GSC/Hamstrings/Quads/Abductors.   - SILT in L3-S1, symmetric bilaterally.  - Feet wwp.     Drain output: Scant.    Lab Results   Component Value Date    WBC 5.7 01/13/2017    WBC 8.9 08/18/2016    HGB 11.6* 01/13/2017    HGB 10.3* 08/23/2016     01/13/2017    * 08/18/2016       Assessment/Plan: Julisa Champion is a 66 year old female s/p stealth assisted extension of PISF to T1-T2, SPO at T1-T2, interbody fusion at T1-T2 on 2/6/2017 with Dr. Wolfe and Dr. Morocho.    Activity: Up with assist. At minimum, patient must be up to a chair on POD 1. To bending, twisting, or lifting > 10 pounds. Cervical collar at all times except for hygiene. No head of bed restrictions.  Weight bearing status: WBAT BUE and BLE.  Antibiotics: Ancef x 24 hours.  Diet: Begin with clear fluids and progress diet as tolerated.   DVT prophylaxis: Ambulatory. SCDs.  Bracing/Splinting: Soft  cervical collar at all times, Will transition to [place] on POD 1 with Orthotics assistance.  Wound Care: Aquacel dressing x 7 days, reinforce PRN.  Drains: Document output per shift, discontinue when <30 cc/shift.   Pain management: transition from IV to orals as tolerated.    X-rays: Standing AP/Lat full spine x-rays prior to discharge.   Physical Therapy: Mobilization, ROM, ADL's.   Occupational Therapy: ADL's  Labs: Trend Hgb on POD #1, 2, 3.   Consults: PT, OT. ICU, Hospitalist, Orthotics, appreciate assistance in caring for this patient.   Follow-up: Clinic with Dr. Wolfe as follows:   Future Appointments  Date Time Provider Department Center   2/7/2017 11:30 AM Thu Galindo, PT [facility] [place]   2/7/2017 3:00 PM Thu Galindo, PT [facility] [place]   2/8/2017 8:00 AM Tete Wheeler OT [facility] [place]   3/23/2017 11:45 AM Isauro Wolfe MD [facility]   4/20/2017 10:30 AM Isauro Wolfe MD [facility]       Disposition: Pending progress with therapies, pain control on orals, and medical stability, anticipate discharge to Home on POD #2-4.      Logan Martini MD 2/6/2017 6:30 PM  Orthopaedic Surgery Resident, PGY-4  Pager: [phone]    For questions about this patient, please attempt to contact me at my pager prior to contacting the orthopaedics resident on call. Thank you!

## 2017-02-07 NOTE — OP NOTE
DATE OF PROCEDURE:  02/06/2017      PREOPERATIVE DIAGNOSES:   1.  Proximal junctional kyphosis.   2.  Thoracic spondylolisthesis with thoracic spinal cord compression and myelopathy.      POSTOPERATIVE DIAGNOSES:   1.  Proximal junctional kyphosis.   2.  Thoracic spondylolisthesis with thoracic spinal cord compression and myelopathy.       PROCEDURE PERFORMED:   1.  T1-T2 transforaminal interbody fusion with bilateral synthetic interbody cage.   2.  Bautista-Stahl osteotomy T1-T2 for deformity correction.   3.  Removal and replacement of T2 and T3 instrumentation.   4.  T1-T2 posterolateral arthrodesis with local harvest autograft and allograft.      CO- SURGEONS:  Isauro Wolfe MD, Orthopaedic Surgery and Zulay Morocho MD, Neurosurgery      RESIDENT:  Logan Martini MD, Orthopaedic Surgery     INDICATIONS FOR SURGERY:  Alayna Champion is a very pleasant 66-year-old woman who underwent a T2 to pelvis posterior instrumented fusion in 08/2016 for sagittal imbalance with scoliosis.  She had done very well healing from that and then began to have worsening pain at the cephalad edge of her construct.  She was found to have proximal junctional kyphosis with T1-T2 spondylolisthesis and thoracic spinal cord compression.  She had symptoms of myelopathy including hand weakness.  She was consented for a T1-T2 transforaminal interbody fusion and extension of fusion to T1.  We explained the risks versus benefits of surgery including bleeding, infection, nerve root damage, failure to heal, spinal cord injury, need for further procedure or revision of procedure.  She understood and wished to proceed.      OPERATIVE COURSE AND INTRAOPERATIVE FINDINGS:  The patient was identified and informed consent was verified.  She was taken to operating room 14 where general endotracheal anesthesia was induced.  An arterial line was placed in order to monitor her mean arterial pressure to ensure spinal cord perfusion and a Rendon catheter  was placed.   Note made that arterial line insertion took 50 minutes with ultrasound usage, and previous case demonstrated difficult arterial line insertion of >30 minutes, therefore any future surgical planning should include time for difficult arterial line insertion.     A tranexamic acid bolus and infusion was begun prior to skin incision.  Perioperative antibiotics were administered within 1 hour of skin incision.  Quijano-Wells tongs were placed for traction.  The patient was positioned prone on the [place] 4-poster table and all extremities points were appropriately padded.  Please note that the patient's morbidly obese body habitus made positioning approximately twice as long as usual.  The patient was prepped and draped in the standard sterile fashion.  Fifteen pounds of traction weight were added to the Quijano-Wells tongs.  Neuro monitoring baselines were obtained.  An intraoperative timeout was performed.      The patient's preexisting incision was incised and carried slightly cephalad.  The incision was opened at the cephalad approximately 30%.  Subperiosteal dissection was carried out to expose the existing T2 through T5 screws.  The set plugs were removed from the screws and sagittal in situ anna benders were used to bend up the existing rods, followed by using a David to cut off the rods with plans of using axial luda connectors to luda in the existing rods to a new anna.     Note was made that right arm SSEPs decreased whenever patient's MAP was under 70. MAP was increased to 90 for duration of case with SSEPs and MEPs remaining at baseline for duration of case.     The O-arm was brought into the field in sterile fashion and a neuronavigation CT spin was obtained.  This was used to place bilateral T1 pedicle screws.  Bilateral size was 5.5 x 30 mm placed using the Medtronic Solera system.  The Stealth was used to identify starting points followed by anatomic verification of the starting point  followed by using an awl to create the intended tract, followed by tapping the tract and placing the screws.  The existing T2 and T3 screws were removed and were up sized.  The bilateral T2 screws were 6.5 x 30 mm and the left T3 screw was 6.5 x 35 mm with the right T3 screw of 5.5 x 35 mm.  The right-sided T4 screw was removed in order to allow for the axial connector and the luda anna.  Screw location was verified with intraoperative CT spin.  We next turned our attention towards spondylolisthesis reduction with Bautista-Stahl osteotomies and interbody fusion at T1-T2.  The superior articulating facets at T1 were removed using [place] osteotomes and were saved for local harvest autograft.  Complete foraminotomies were completed bilaterally and the superior articular facet was truncated flush with the pedicle.  The exiting nerve root was identified and protected using a Penfield 4 while the black-handled osteotome was used to enter the disk space and insert and rotate distractors were used in the disk space bilaterally.  Fluoroscopy was used to visualize trajectory.  A Alticasttronic interbody Capstone 6 mm x 22 mm was selected and one was placed on the right and one placed on the left.  These were filled with BMP.  Fluoroscopy was used to verify trajectory.  We also performed a CT scan to verify trajectory as trajectory was difficult to visualize using fluoroscopy due to the patient's obese body habitus.  Once implant localization had been confirmed, we proceeded to custom cut and contour new rods from T1 through T4 and used axial connectors at T4 to luda these in to the existing rods.  The set plugs were provisionally tightened, AP and lateral x-rays were obtained, and the set plugs were torqued off to 's recommended torque.      We had achieved good anatomic realignment of T1, T2, and there was no longer any compression on the spinal cord.  The incision was irrigated with antibiotic-impregnated saline  and hemostasis was verified.  A high-speed electric drill was used to perform posterolateral decortication and local harvest autograft as well as allograft mixed with BMP sponge was used for posterolateral arthrodesis.  The incision had 1 gram of vancomycin powder sprinkled into it.  The incision was closed with 1-0 Vicryl interrupted sutures in the fascia followed by 1-0 Vicryl running suture in the fascia, followed by placement of a medium Hemovac drain above the fascia, followed by closure further with 2-0 inverted interrupted Vicryl sutures followed by 2-0 running Vicryl sutures, followed by 4-0 Monocryl.  Benzoin and Steri-Strips were applied, followed by a sterile dressing.  The Kabbage-Celnyx tongs were removed.  The patient was positioned supine on the transport gurney and was taken to the PACU in stable condition.      This case required a 2-surgeon approach due to the complexity of spinal deformity reduction around the thoracic spinal cord.      I was present for the entire procedure and I performed key portions of the procedure along with Dr. Wolfe completely.    Patient's MAP will be kept >80 to ensure spinal cord perfusion until patient awakens with satisfactory neuro exam that is followable for any changes with blood pressure normalization.       DAISY GIBSON MD             D: 2017 18:36   T: 2017 21:31   MT: CT      Name:     MARIA DE JESUS CAVAZOS   MRN:      [id number]        Account:        [id number]   :      1950           Procedure Date: 2017      Document: [id number]

## 2017-02-07 NOTE — PLAN OF CARE
Problem: Goal Outcome Summary  Goal: Goal Outcome Summary  Outcome: Improving  Received report from the PACU/ICU RN at 1045. Patient transferred to Unit 10A from the PACU/ICU at 1215 in safe and stable condition via stretcher accompanied by the PACU/ICU RN. Patient A & O x 3. Neuros and CMS intact, no numbness/tingling present per patient. VS - BP slightly low 92/42 and afebrile, SpO2 97% on 2 LPM NC (see flowsheet), MD aware. LS are coarse with crackles. BS + x 4, but hypoactive patient not passing flatus yet. Rendon catheter removed this AM and patient due to void. Patient states pain is tolerable. Pain meds given as ordered and PRN, ice packs used for comfort. Patient is on a Dilaudid PCA at 0.2 mg q 10 mins. With 1.8 mg lockout. Surgical site dressing is C/D/I. Hemovac patent with 10 ml of bloody drainage output. Right PIV infusing, Left PIV saline locked. Patient on a regular diet and tolerating it well. BG was 115 at 0320. Patient up ad elver with assist of 1 and walker in room. Hgb 10.1, K+ 4.4, Mg 2.0 this AM, MD aware. Patient able to make needs known. Call light within reach. Will continue to monitor patient.

## 2017-02-07 NOTE — OP NOTE
DATE OF SERVICE:  02/06/2017.      PREOPERATIVE DIAGNOSES:   1.  T1-T2 subluxation with myeloradiculopathy.   2.  Adult scoliosis.   3.  History of spinal fusion.      POSTOPERATIVE DIAGNOSES:     1.  T1-T2 subluxation with myeloradiculopathy.   2.  Adult scoliosis.   3.  History of spinal fusion.      PROCEDURES:  Extension of fusion to T1, pedicle screw instrumentation T1, decompression bilaterally T1-T2, Bautista Sanchez osteotomy T1-T2, placement intervertebral device T1-T2, image-guided surgery, posterior spinal fusion T1-T2.      SURGEON:  Isauro Wolfe Jr., MD      COSURGEON:  Zulay Morocho MD Of note is Dr. Zulay Morocho was a cosurgeon for this case because of the need for bilateral decompression and a bilateral operative team along with the complexity of the case given the patient's size, previous surgery and the myeloradiculopathy.        INDICATION FOR OPERATION:  Julisa Champion is an adult female with a history of a previous posterior spinal fusion in the August timeframe of this year and this was done from T2 to the pelvis.  Initially she did quite well and has had marked improvement of her lumbar stenosis symptoms and her overall sagittal imbalance symptoms.  However, on followup, she presented back with a T1-T2 proximal junctional kyphosis, with resultant spinal cord compression and myeloradiculopathy.  Initially she was unwilling to undergo corrective surgery; however, she has had progressive pain and difficulties meriting surgical intervention.  Of note is Dr. Zulay Morocho was a cosurgeon for this case because of the need for bilateral decompression and a bilateral operative team along with the complexity of the case given the patient's size, previous surgery and the myeloradiculopathy.      DESCRIPTION OF TECHNICAL PROCEDURES USED:  The OR team was briefed about the plan.  The patient was brought to the operating room and underwent the induction of adequate general anesthesia.  Of note is  that anesthesia had a particularly challenging time placing the arterial line on this patient.      She was then turned in position prone on the [place] table 4-poster frame.  The arms were placed in a tucked position and the shoulders taped in order to secure her in place.  She was prepared and draped in the usual sterile fashion and a timeout was done confirming the site and type of surgery.  She did receive prophylactic antibiotics and tranexamic acid.      The previous incision was opened up and extended proximally.  The spine was progressively exposed.  The set plugs were removed from T2 through T5 and the rods bent up and then cut off at approximately the T4 level.     Prior to the decompression, the pedicle screw based reference frame was placed.  The O-arm was used to obtain intraoperative 3D images.  Of note, is that it was very difficult to visualize her anatomy on planar imaging and we were able to use the high definition settings on the O-arm to get adequate resolution.  We were able to identify the pedicle screw start points for T2 and then we were able to utilize a navigated probe, create a tract and then dilate this up and tap it and place 5.5 x 30 pedicle screws bilaterally.  Of note was that we did upsize the T2 and T3 pedicle screws, which were somewhat loose and at T2 we placed a 6.5 x 30 bilaterally and at T3, 6.5 x 35 on the left, 5.5 x 35 on the right.  Check spin was done and confirmed acceptable screw positioning. The screws were titanium with cobalt chrome heads from the Medtronic Solera 5.5/6.0 system.    The inferior articular facets of T1 were resected using an osteotome.  There was marked dense adherent scar present at T1-T2.  The previous interspinous suture had ripped through and the T1-T2 level had ligamentous incompetency with significant angulation and translation.  This scar was taken down with a rongeur and then dissected further with angled curettes. After the scar was taken down  and freed up with a series of forward angled curettes and Kerrisons, next the superior articular facets of T2 were taken down bilaterally.  This completed the Smith-Stahl osteotomy.       The T1 nerve roots were then retracted cephalad and the disk space identified with a Penfield 4 and then entered with an osteotome.  This was done bilaterally simultaneously.  [place] on the osteotomes, we were able to gain some mobility of the T1-T2 segment in order to be able to realign it.  Next, a series of dilators were placed and confirmed under fluoroscopy.      We entered the disc space, elevated it up, and  placed 2 Medtronic PEEK Capstone interbody devices and were 6 mm high, 22 mm long.  A half of BMP sponge was placed into each one and this was seated into place.  Next, 2 appropriate length rods were cut and contoured and progressively seated into place in a cantilever fashion, improving the spinal alignment and then locking these into axial connectors.  Neurologic monitoring was stable throughout this event.  At this point, we irrigated the wound, did an intraoperative repeat set of 2D and 3D images and found the position to be acceptable.      The posterior elements were now decorticated.  The local bone graft was used to perform a posterior and posterolateral fusion.  This was supplemented with 15 mL of crushed cancellous bone along with a single BMP sponge that was cut longitudinally and placed laterally on both sides.  The wound was then closed in layers.  Interrupted #1 absorbable sutures were used to reapproximate the thoracolumbar fascia and #1 runner was run over top of this.  An 1/8-inch Hemovac drain was placed superficial to the fascia and brought out distally.  Interrupted #1s were used to close the deep subcutaneous fashion, followed by 2-0 runner, followed by a 3-0 intradermal.  Benzoin, Steri-Strips and an Aquacel dressing were applied.      Of note, at the beginning of the case, we did place  Quijano-Jeremiah tongs and these were removed at the end of the case.  Again, neurologic monitoring was stable throughout.  We did use 15 pounds of weights was for inline traction during the surgery.  Of note is that when the patient's mean arterial pressure dropped below a map of 80 there was some change in the somatosensory evoked potentials, but no change in the motor evoked potentials.      The patient was turned supine, extubated and transported to the postanesthesia care unit in stable condition with the plan to monitor her overnight in the [place] surgical stabilization unit, maintaining her mean arterial pressures above 80.         HARRIET CHRISTINA JR, MD             D: 2017 07:09   T: 2017 07:42   MT: MAC      Name:     JULISA CAVAZOS   MRN:      [id number]        Account:        [id number]   :      1950           Procedure Date: 2017      Document: [id number]       cc: Copy for Patient        Julisa Sencorina        Zulay Morocho MD

## 2017-02-07 NOTE — PROGRESS NOTES
Neurosurgery Attending Progress Note    POD 1 T1-T2 transforaminal interbody fusion, extension of fusion to T1    Patient seen and examined, doing well  No new numbness/tingling/weakness  Strength is 5/5 BUE except hand intrinsics 4/5 at baseline, baseline bilateral 4-5th digit mild tingling  Strength 5/5 BLE, sensation intact  [place] J in place, fitting well      Awaiting PT eval, anticipate DC 2/8 to home    Nonproductive cough with mild wheezing, encouraged incentive spirometry during every TV commercial  Elevate HOB to 90 degrees for all PO intake due to [place] J aspiration risk

## 2017-02-07 NOTE — PROGRESS NOTES
Orthopaedic Surgery Progress Note    E: No acute events overnight. MAPs > 80 overnight. No issues.     S: Pain well controlled this AM with Dilaudid PCA. Denies any significant arm pain. Denies numbness or tingling to bilateral hands/upper extremities or legs. Denies chest pain, shortness of breath, nausea, or vomiting. Rendon catheter remains in place. No other issues or concerns at present.     O:  Filed Vitals:    02/07/17 0330 02/07/17 0400 02/07/17 0500 02/07/17 0600   BP:       Pulse:       Temp:  97.6  F (36.4  C)     TempSrc:  Axillary     Resp: 9 14 8 10   Height:       Weight:       SpO2: 99% 98% 98% 98%         Exam:  Gen: No acute distress, resting comfortably in bed.  Resp: Non-labored breathing on nasal cannula.  Back: Aquacel dressing in place, Hemovac tubing in place with small amount of bloody drainage in canister.   BUE:  - 5/5 EPL  - 4+/5 IO  - 5/5   - 5/5 Deltoids  - SILT in med/rad/uln/ax.  - Hands warm and well perfused.  BLE:  - 5/5 EHL/FHL/TA/GSC/Hamstrings/Quads/Abductors.   - SILT in L3-S1, symmetric bilaterally.  - Feet wwp.     Drain output: 50 cc total overnight.    Lab Results   Component Value Date    WBC 8.2 02/07/2017    WBC 5.7 01/13/2017    HGB 10.1* 02/07/2017    HGB 11.6* 01/13/2017     02/07/2017     01/13/2017       Assessment/Plan: Julisa Champion is a 66 year old female s/p stealth assisted T1-T2 transforaminal interbody fusion with bilateral synthetic interbody cages, SPO T1-T2, removal/replacement of T2-T3 instrumentation, and T1-T2 posterolateral arthrodesis with local harvest autograft and allograft with Dr. Wolfe and Dr. Morocho. Doing well post-op.     - OK from Ortho Perspective to Transfer to Floor vs IMC today, will ask Hospitalist to assess for appropriateness.   - OK to DC MAP goal > 80 and DC Art line.   - OK to DC Rendon catheter this AM.  - Orthotics to fit with [place] J collar today (Collar will remain in place x 6 weeks minimum).  - Will get  patient up to chair today with PT.     Activity: Up with assist. At minimum, patient must be up to a chair on POD 1. To bending, twisting, or lifting > 10 pounds. Cervical collar at all times except for hygiene. No head of bed restrictions.  Weight bearing status: WBAT BUE and BLE.  Antibiotics: Ancef x 24 hours.  Diet: Begin with clear fluids and progress diet as tolerated.   DVT prophylaxis: Ambulatory. SCDs.  Bracing/Splinting: Soft cervical collar at all times, Will transition to [facility] on POD 1 with Orthotics assistance.  Wound Care: Aquacel dressing x 7 days, reinforce PRN.  Drains: Document output per shift, discontinue when <30 cc/shift.   Pain management: transition from IV to orals as tolerated.    X-rays: Standing AP/Lat full spine x-rays prior to discharge.   Physical Therapy: Mobilization, ROM, ADL's.   Occupational Therapy: ADL's  Labs: Trend Hgb on POD #1, 2, 3.   Consults: PT, OT. ICU, Hospitalist, Orthotics, appreciate assistance in caring for this patient.   Follow-up: Clinic with Dr. Wolfe as follows:   Future Appointments  Date Time Provider Department Center   2/7/2017 11:30 AM Thu Galindo PT [facility] [place]   2/7/2017 3:00 PM Thu Galindo PT [facility] [place]   2/8/2017 8:00 AM Tete Wheeler OT [facility] [place]   3/23/2017 11:45 AM [facility]   4/20/2017 10:30 AM [facility]       Disposition: Pending progress with therapies, pain control on orals, and medical stability, anticipate discharge to Home on POD #3-4.      Logan Martini MD  Orthopaedic Surgery Resident, PGY-4  Pager: [phone]

## 2017-02-08 ENCOUNTER — APPOINTMENT (OUTPATIENT)
Dept: PHYSICAL THERAPY | Facility: CLINIC | Age: 67
DRG: 457 | End: 2017-02-08
Attending: ORTHOPAEDIC SURGERY
Payer: MEDICARE

## 2017-02-08 ENCOUNTER — APPOINTMENT (OUTPATIENT)
Dept: OCCUPATIONAL THERAPY | Facility: CLINIC | Age: 67
DRG: 457 | End: 2017-02-08
Attending: ORTHOPAEDIC SURGERY
Payer: MEDICARE

## 2017-02-08 LAB
ALBUMIN UR-MCNC: NEGATIVE MG/DL
ANION GAP SERPL CALCULATED.3IONS-SCNC: 4 MMOL/L (ref 3–14)
APPEARANCE UR: CLEAR
BILIRUB UR QL STRIP: NEGATIVE
BUN SERPL-MCNC: 17 MG/DL (ref 7–30)
CALCIUM SERPL-MCNC: 8 MG/DL (ref 8.5–10.1)
CHLORIDE SERPL-SCNC: 104 MMOL/L (ref 94–109)
CO2 SERPL-SCNC: 28 MMOL/L (ref 20–32)
COLOR UR AUTO: ABNORMAL
CREAT SERPL-MCNC: 1.19 MG/DL (ref 0.52–1.04)
ERYTHROCYTE [DISTWIDTH] IN BLOOD BY AUTOMATED COUNT: 14.3 % (ref 10–15)
GFR SERPL CREATININE-BSD FRML MDRD: 45 ML/MIN/1.7M2
GLUCOSE SERPL-MCNC: 107 MG/DL (ref 70–99)
GLUCOSE UR STRIP-MCNC: NEGATIVE MG/DL
HCT VFR BLD AUTO: 30.6 % (ref 35–47)
HGB BLD-MCNC: 9.4 G/DL (ref 11.7–15.7)
HGB UR QL STRIP: NEGATIVE
KETONES UR STRIP-MCNC: NEGATIVE MG/DL
LACTATE BLD-SCNC: 1.4 MMOL/L (ref 0.7–2.1)
LEUKOCYTE ESTERASE UR QL STRIP: NEGATIVE
MAGNESIUM SERPL-MCNC: 2.2 MG/DL (ref 1.6–2.3)
MCH RBC QN AUTO: 29.6 PG (ref 26.5–33)
MCHC RBC AUTO-ENTMCNC: 30.7 G/DL (ref 31.5–36.5)
MCV RBC AUTO: 96 FL (ref 78–100)
MUCOUS THREADS #/AREA URNS LPF: PRESENT /LPF
NITRATE UR QL: NEGATIVE
PH UR STRIP: 5 PH (ref 5–7)
PHOSPHATE SERPL-MCNC: 4.4 MG/DL (ref 2.5–4.5)
PLATELET # BLD AUTO: 188 10E9/L (ref 150–450)
POTASSIUM SERPL-SCNC: 3.6 MMOL/L (ref 3.4–5.3)
RBC # BLD AUTO: 3.18 10E12/L (ref 3.8–5.2)
RBC #/AREA URNS AUTO: 1 /HPF (ref 0–2)
SODIUM SERPL-SCNC: 136 MMOL/L (ref 133–144)
SP GR UR STRIP: 1.01 (ref 1–1.03)
SQUAMOUS #/AREA URNS AUTO: <1 /HPF (ref 0–1)
URN SPEC COLLECT METH UR: ABNORMAL
UROBILINOGEN UR STRIP-MCNC: NORMAL MG/DL (ref 0–2)
WBC # BLD AUTO: 7.6 10E9/L (ref 4–11)
WBC #/AREA URNS AUTO: 2 /HPF (ref 0–2)

## 2017-02-08 PROCEDURE — A9270 NON-COVERED ITEM OR SERVICE: HCPCS | Mod: GY | Performed by: ORTHOPAEDIC SURGERY

## 2017-02-08 PROCEDURE — 81001 URINALYSIS AUTO W/SCOPE: CPT | Performed by: INTERNAL MEDICINE

## 2017-02-08 PROCEDURE — 40000133 ZZH STATISTIC OT WARD VISIT

## 2017-02-08 PROCEDURE — 94640 AIRWAY INHALATION TREATMENT: CPT | Mod: 76

## 2017-02-08 PROCEDURE — 97165 OT EVAL LOW COMPLEX 30 MIN: CPT | Mod: GO

## 2017-02-08 PROCEDURE — 25000308 HC RX OP HPI UCR WEL MED 250 IP 250: Performed by: INTERNAL MEDICINE

## 2017-02-08 PROCEDURE — 83605 ASSAY OF LACTIC ACID: CPT | Performed by: INTERNAL MEDICINE

## 2017-02-08 PROCEDURE — 25000132 ZZH RX MED GY IP 250 OP 250 PS 637: Mod: GY | Performed by: ORTHOPAEDIC SURGERY

## 2017-02-08 PROCEDURE — 94640 AIRWAY INHALATION TREATMENT: CPT

## 2017-02-08 PROCEDURE — A9270 NON-COVERED ITEM OR SERVICE: HCPCS | Mod: GY | Performed by: PHYSICIAN ASSISTANT

## 2017-02-08 PROCEDURE — 12000008 ZZH R&B INTERMEDIATE UMMC

## 2017-02-08 PROCEDURE — 97116 GAIT TRAINING THERAPY: CPT | Mod: GP | Performed by: PHYSICAL THERAPIST

## 2017-02-08 PROCEDURE — 83735 ASSAY OF MAGNESIUM: CPT | Performed by: PHYSICIAN ASSISTANT

## 2017-02-08 PROCEDURE — A9270 NON-COVERED ITEM OR SERVICE: HCPCS | Mod: GY | Performed by: INTERNAL MEDICINE

## 2017-02-08 PROCEDURE — 80048 BASIC METABOLIC PNL TOTAL CA: CPT | Performed by: PHYSICIAN ASSISTANT

## 2017-02-08 PROCEDURE — 25000132 ZZH RX MED GY IP 250 OP 250 PS 637: Mod: GY | Performed by: PHYSICIAN ASSISTANT

## 2017-02-08 PROCEDURE — 40000275 ZZH STATISTIC RCP TIME EA 10 MIN

## 2017-02-08 PROCEDURE — 97535 SELF CARE MNGMENT TRAINING: CPT | Mod: GO

## 2017-02-08 PROCEDURE — 87086 URINE CULTURE/COLONY COUNT: CPT | Performed by: INTERNAL MEDICINE

## 2017-02-08 PROCEDURE — 25000128 H RX IP 250 OP 636: Performed by: INTERNAL MEDICINE

## 2017-02-08 PROCEDURE — 99232 SBSQ HOSP IP/OBS MODERATE 35: CPT | Performed by: INTERNAL MEDICINE

## 2017-02-08 PROCEDURE — 97530 THERAPEUTIC ACTIVITIES: CPT | Mod: GP | Performed by: PHYSICAL THERAPIST

## 2017-02-08 PROCEDURE — 25000132 ZZH RX MED GY IP 250 OP 250 PS 637: Mod: GY | Performed by: INTERNAL MEDICINE

## 2017-02-08 PROCEDURE — 36415 COLL VENOUS BLD VENIPUNCTURE: CPT | Performed by: PHYSICIAN ASSISTANT

## 2017-02-08 PROCEDURE — 36415 COLL VENOUS BLD VENIPUNCTURE: CPT | Performed by: INTERNAL MEDICINE

## 2017-02-08 PROCEDURE — 85027 COMPLETE CBC AUTOMATED: CPT | Performed by: PHYSICIAN ASSISTANT

## 2017-02-08 PROCEDURE — 40000193 ZZH STATISTIC PT WARD VISIT: Performed by: PHYSICAL THERAPIST

## 2017-02-08 PROCEDURE — 84100 ASSAY OF PHOSPHORUS: CPT | Performed by: PHYSICIAN ASSISTANT

## 2017-02-08 RX ORDER — AMOXICILLIN 250 MG
2 CAPSULE ORAL 2 TIMES DAILY
Status: DISCONTINUED | OUTPATIENT
Start: 2017-02-08 | End: 2017-02-08

## 2017-02-08 RX ORDER — POLYETHYLENE GLYCOL 3350 17 G/17G
17 POWDER, FOR SOLUTION ORAL 2 TIMES DAILY
Status: DISCONTINUED | OUTPATIENT
Start: 2017-02-08 | End: 2017-02-08

## 2017-02-08 RX ORDER — ALBUTEROL SULFATE 5 MG/ML
2.5 SOLUTION RESPIRATORY (INHALATION) EVERY 4 HOURS PRN
Status: DISCONTINUED | OUTPATIENT
Start: 2017-02-08 | End: 2017-02-10 | Stop reason: HOSPADM

## 2017-02-08 RX ORDER — DIAZEPAM 5 MG
5 TABLET ORAL EVERY 6 HOURS PRN
Qty: 20 TABLET | Refills: 0 | Status: SHIPPED | OUTPATIENT
Start: 2017-02-08 | End: 2017-02-14

## 2017-02-08 RX ORDER — AMOXICILLIN 250 MG
2 CAPSULE ORAL 2 TIMES DAILY
Status: DISCONTINUED | OUTPATIENT
Start: 2017-02-08 | End: 2017-02-10 | Stop reason: HOSPADM

## 2017-02-08 RX ORDER — POLYETHYLENE GLYCOL 3350 17 G/17G
17 POWDER, FOR SOLUTION ORAL 2 TIMES DAILY
Status: DISCONTINUED | OUTPATIENT
Start: 2017-02-08 | End: 2017-02-10 | Stop reason: HOSPADM

## 2017-02-08 RX ORDER — HYDROMORPHONE HCL/0.9% NACL/PF 0.2MG/0.2
.2-.3 SYRINGE (ML) INTRAVENOUS
Status: DISCONTINUED | OUTPATIENT
Start: 2017-02-08 | End: 2017-02-10 | Stop reason: HOSPADM

## 2017-02-08 RX ORDER — ALBUTEROL SULFATE 0.83 MG/ML
2.5 SOLUTION RESPIRATORY (INHALATION)
Status: DISCONTINUED | OUTPATIENT
Start: 2017-02-08 | End: 2017-02-10 | Stop reason: HOSPADM

## 2017-02-08 RX ORDER — AMOXICILLIN 250 MG
1-2 CAPSULE ORAL 2 TIMES DAILY PRN
Qty: 100 TABLET | Refills: 0 | Status: SHIPPED | OUTPATIENT
Start: 2017-02-08 | End: 2017-02-20

## 2017-02-08 RX ORDER — SODIUM CHLORIDE 9 MG/ML
INJECTION, SOLUTION INTRAVENOUS CONTINUOUS
Status: DISCONTINUED | OUTPATIENT
Start: 2017-02-08 | End: 2017-02-10 | Stop reason: HOSPADM

## 2017-02-08 RX ORDER — BISACODYL 10 MG
10 SUPPOSITORY, RECTAL RECTAL ONCE
Status: DISCONTINUED | OUTPATIENT
Start: 2017-02-08 | End: 2017-02-09

## 2017-02-08 RX ADMIN — NORTRIPTYLINE HYDROCHLORIDE 10 MG: 10 CAPSULE ORAL at 21:33

## 2017-02-08 RX ADMIN — ALBUTEROL SULFATE 2.5 MG: 2.5 SOLUTION RESPIRATORY (INHALATION) at 19:41

## 2017-02-08 RX ADMIN — OXYCODONE HYDROCHLORIDE 10 MG: 5 TABLET ORAL at 20:11

## 2017-02-08 RX ADMIN — GUAIFENESIN 600 MG: 600 TABLET, EXTENDED RELEASE ORAL at 20:11

## 2017-02-08 RX ADMIN — ACETAMINOPHEN 975 MG: 325 TABLET ORAL at 11:27

## 2017-02-08 RX ADMIN — ACETAMINOPHEN 975 MG: 325 TABLET ORAL at 20:10

## 2017-02-08 RX ADMIN — ACETAMINOPHEN 975 MG: 325 TABLET ORAL at 04:16

## 2017-02-08 RX ADMIN — SENNOSIDES AND DOCUSATE SODIUM 2 TABLET: 8.6; 5 TABLET ORAL at 20:11

## 2017-02-08 RX ADMIN — OXYCODONE HYDROCHLORIDE 10 MG: 5 TABLET ORAL at 08:48

## 2017-02-08 RX ADMIN — ALBUTEROL SULFATE 2.5 MG: 2.5 SOLUTION RESPIRATORY (INHALATION) at 09:33

## 2017-02-08 RX ADMIN — ALBUTEROL SULFATE 2.5 MG: 2.5 SOLUTION RESPIRATORY (INHALATION) at 15:17

## 2017-02-08 RX ADMIN — VENLAFAXINE 225 MG: 75 TABLET ORAL at 20:11

## 2017-02-08 RX ADMIN — SODIUM CHLORIDE: 9 INJECTION, SOLUTION INTRAVENOUS at 09:16

## 2017-02-08 RX ADMIN — ALBUTEROL SULFATE 2.5 MG: 2.5 SOLUTION RESPIRATORY (INHALATION) at 03:08

## 2017-02-08 RX ADMIN — GABAPENTIN 100 MG: 100 CAPSULE ORAL at 08:48

## 2017-02-08 RX ADMIN — GABAPENTIN 100 MG: 100 CAPSULE ORAL at 20:11

## 2017-02-08 RX ADMIN — SENNOSIDES AND DOCUSATE SODIUM 2 TABLET: 8.6; 5 TABLET ORAL at 09:13

## 2017-02-08 RX ADMIN — OXYCODONE HYDROCHLORIDE 10 MG: 5 TABLET ORAL at 15:44

## 2017-02-08 RX ADMIN — GUAIFENESIN 600 MG: 600 TABLET, EXTENDED RELEASE ORAL at 08:48

## 2017-02-08 RX ADMIN — POLYETHYLENE GLYCOL 3350 17 G: 17 POWDER, FOR SOLUTION ORAL at 20:10

## 2017-02-08 RX ADMIN — DIAZEPAM 5 MG: 5 TABLET ORAL at 02:45

## 2017-02-08 RX ADMIN — POLYETHYLENE GLYCOL 3350 17 G: 17 POWDER, FOR SOLUTION ORAL at 09:14

## 2017-02-08 RX ADMIN — GABAPENTIN 100 MG: 100 CAPSULE ORAL at 13:49

## 2017-02-08 RX ADMIN — ALBUTEROL SULFATE 2.5 MG: 2.5 SOLUTION RESPIRATORY (INHALATION) at 11:45

## 2017-02-08 RX ADMIN — OXYCODONE HYDROCHLORIDE 10 MG: 5 TABLET ORAL at 12:25

## 2017-02-08 RX ADMIN — DIAZEPAM 5 MG: 5 TABLET ORAL at 13:49

## 2017-02-08 NOTE — PROGRESS NOTES
02/08/17 0800   Quick Adds   Type of Visit Initial Occupational Therapy Evaluation   Living Environment   Lives With spouse   Living Arrangements house   Home Accessibility bed and bath on same level   Number of Stairs to Enter Home 3   Number of Stairs Within Home 0   Transportation Available family or friend will provide   Living Environment Comment Pt lives with spouse in house with 3 stairs to enter and all needs within first floor. Pt has raised toilet seat, no grab bars. Shower is walk-in.  there to assist.    Self-Care   Dominant Hand right   Usual Activity Tolerance moderate   Current Activity Tolerance moderate   Regular Exercise no   Equipment Currently Used at Home walker, rolling;raised toilet;shower chair   Functional Level Prior   Ambulation 2-->assistive person  ( assists at baseline)   Transferring 1-->assistive equipment   Toileting 1-->assistive equipment  (raised toilet)   Bathing 3-->assistive equipment and person  ( assists, shower chair)   Dressing 2-->assistive person   Eating 0-->independent   Communication 0-->understands/communicates without difficulty   Swallowing 0-->swallows foods/liquids without difficulty   Cognition 0 - no cognition issues reported   Fall history within last six months no   General Information   Onset of Illness/Injury or Date of Surgery - Date 02/07/17   Referring Physician Logan Martini MD   Patient/Family Goals Statement Home by Friday   Additional Occupational Profile Info/Pertinent History of Current Problem s/p stealth assisted T1-T2 transforaminal interbody fusion with bilateral synthetic interbody cages, SPO T1-T2, removal/replacement of T2-T3 instrumentation, and T1-T2 posterolateral arthrodesis with local harvest autograft and allograft, with cervical myelopathy.    Precautions/Limitations fall precautions;spinal precautions  ([place] J collar on at all times except hygiene)   Weight-Bearing Status - LUE (no lifting >10 lbs)    Weight-Bearing Status - RUE (no lifting >10 lbs)   Weight-Bearing Status - LLE weight-bearing as tolerated   Weight-Bearing Status - RLE weight-bearing as tolerated   General Observations patient alert,  assisting back into bed. [place] J collar on.    Cognitive Status Examination   Cognitive Comment no concerns noted,    Visual Perception   Visual Perception Wears glasses   Sensory Examination   Sensory Quick Adds No deficits were identified   Pain Assessment   Patient Currently in Pain Yes, see Vital Sign flowsheet   Range of Motion (ROM)   ROM Comment B UE's appears WFL.    Strength   Strength Comments not formally tested secondary to precautions. Decreased strength post op.    Muscle Tone Assessment   Muscle Tone Quick Adds No deficits were identified   Coordination   Upper Extremity Coordination No deficits were identified   Mobility   Bed Mobility Comments Ax2 for boost up in bed   Transfer Skills   Transfer Comments NA as patient just returned to bed.    Upper Body Dressing   Level of Benge: Dress Upper Body minimum assist (75% patients effort)   Physical Assist/Nonphysical Assist: Dress Upper Body set-up required   Lower Body Dressing   Level of Benge: Dress Lower Body moderate assist (50% patients effort)   Physical Assist/Nonphysical Assist: Dress Lower Body set-up required   Eating/Self Feeding   Level of Benge: Eating independent   Physical Assist/Nonphysical Assist: Eating set-up required   Activities of Daily Living Analysis   Impairments Contributing to Impaired Activities of Daily Living balance impaired;pain;post surgical precautions;ROM decreased;strength decreased   General Therapy Interventions   Planned Therapy Interventions ADL retraining;transfer training   Clinical Impression   Criteria for Skilled Therapeutic Interventions Met yes, treatment indicated   OT Diagnosis Decreased functional mobility and ADLs/IADLs   Influenced by the following impairments pain, post  "op precautions, decreased strength post op   Assessment of Occupational Performance 3-5 Performance Deficits   Identified Performance Deficits dressing, bathing, toileting, home mgmt   Clinical Decision Making (Complexity) Low complexity   Therapy Frequency daily   Predicted Duration of Therapy Intervention (days/wks) 3 days   Anticipated Discharge Disposition Home with Assist   Risks and Benefits of Treatment have been explained. Yes   Patient, Family & other staff in agreement with plan of care Yes   [facility] [place] \"6 Clicks\"   2016, Trustees of [facility], under license to [employer].  All rights reserved.   6 Clicks Short Forms Daily Activity Inpatient Short Form   [facility]  \"6 Clicks\" Daily Activity Inpatient Short Form   1. Putting on and taking off regular lower body clothing? 2 - A Lot   2. Bathing (including washing, rinsing, drying)? 2 - A Lot   3. Toileting, which includes using toilet, bedpan or urinal? 3 - A Little   4. Putting on and taking off regular upper body clothing? 3 - A Little   5. Taking care of personal grooming such as brushing teeth? 3 - A Little   6. Eating meals? 4 - None   Daily Activity Raw Score (Score out of 24.Lower scores equate to lower levels of function) 17   Total Evaluation Time   Total Evaluation Time (Minutes) 8     "

## 2017-02-08 NOTE — PROGRESS NOTES
Care Coordinator Progress Note     Admission Date/Time:  2/6/2017  Attending MD:  Isauro Wolfe MD     Data  Chart reviewed, discussed with interdisciplinary team.   Patient was admitted for:    S/P spinal fusion  Other secondary scoliosis, thoracolumbar region.    Concerns with insurance coverage for discharge needs: None.  Current Living Situation: Patient lives with spouse.  Support System: Supportive and Involved  Services Involved: n/a prior to admission  Transportation: Family or Friend will provide  Barriers to Discharge: none anticipated once cleared for d/c by MD teams    Coordination of Care and Referrals: No referrals indicated at this time.      Assessment  Patient is s/p spinal fusion by Dr Wolfe. Met with patient and her , Christian at bedside. They reside in [place], [place] and have no DME or home care concerns. Patient discharged with home care services after spinal fusion in August 2016 however she reports after initial RN visit she didn't need any additional visits, including PT/OT evals. Christian will be available to assist as needed once home as well as other local family and friends.     Plan  Anticipated Discharge Date:  1-2 days per care team rounds  Anticipated Discharge Plan:  Home with family assist    Kimmie Marcus RN  Care Coordinator  Pager [phone]

## 2017-02-08 NOTE — PLAN OF CARE
Problem: Goal Outcome Summary  Goal: Goal Outcome Summary  OT: Limited eval completed. Patient had just returned to bed after being up in bathroom and sitting up to eat breakfast.  present and very supportive. Educated on role of OT, POC, post op precautions and discussed AE for ADLs. Patient's  will assist with dressing and showers. Will see patient tomorrow; anticipate dc from OT in next 1-2 days. Recommend discharge home with assist from .

## 2017-02-08 NOTE — PLAN OF CARE
Problem: Goal Outcome Summary  Goal: Goal Outcome Summary  Outcome: Therapy, progress toward functional goals is gradual  Pt said pain manageable except when she moves. Assist of 1 to get up & ambulate to bathroom with her own walker, her  helped her. At 10:00 pt able to void 100cc & scanned for 131cc. PCA DC'd at 9:30. Collar on, incision CDI neuros intact. No resp distress but pt did have a coughing spell, gave med as ordered. LS a little wheezy. Able to void again, scanned for 156cc. Pain still at OK level.

## 2017-02-08 NOTE — PLAN OF CARE
Problem: Goal Outcome Summary  Goal: Goal Outcome Summary  Outcome: Improving  A/O x 4. Pain controlled to a tolerable level with dilaudid PCA and valium. Episode of wheezing at 0330 and respiratory therapy came for a neb tx and pt feels much better. O2 sats have remained above 93% through the night. CMS and Neuro's are intact. [place]-J collar is in place. Dressing to posterior neck is intact.  is at bedside. Able to make needs known cont to assess.

## 2017-02-08 NOTE — PROVIDER NOTIFICATION
Notified MD at 1945 PM regarding low urine output.    Spoke with: Dr. Salomon  Orders were obtained.  Comments: No UO since victor hugo samuels'd 0830, pvr 176cc @ 1845, 500cc NS bolus ordered

## 2017-02-08 NOTE — PROGRESS NOTES
Orthopaedic Surgery Progress Note    E: Unable to void since gay removal yesterday, straight cath x1 with 450cc output. IVF bolus per medicine.     S: Pain well controlled this AM with Dilaudid PCA. Denies numbness or tingling to bilateral hands/upper extremities or legs. Denies chest pain, shortness of breath, nausea, or vomiting although felt wheezy overnight and had neb with good relief. Unable to void as above, wants to get up to bathroom this AM and try again. Passing flatus. No abd pain. No nausea. Tolerating diet. Working with PT. Robby WILKS fitted.     O:  Filed Vitals:    02/07/17 1628 02/07/17 1907 02/07/17 2342 02/08/17 0308   BP: 105/50 106/42 106/48    Pulse: 92 92 96    Temp:   98.9  F (37.2  C)    TempSrc:   Oral    Resp:   14    Height:       Weight:       SpO2:   96% 98%         Exam:  Gen: No acute distress, resting comfortably in bed. Conversant. Robby WILKS in place.   Resp: Non-labored breathing on nasal cannula.  Back: Aquacel dressing in place, Hemovac tubing in place with small amount of bloody drainage in canister.   BUE:  - 5/5 EPL  - 4+/5 IO  - 5/5   - 5/5 Deltoids  - SILT in med/rad/uln/ax.  - Hands warm and well perfused.  BLE:  - 5/5 EHL/FHL/TA/GSC/Quads/Abductors.   - SILT in L3-S1, symmetric bilaterally.  - Feet wwp.     Drain output: minimal overnight, 20/10cc last 2 shifts yesterday    Lab Results   Component Value Date    WBC 8.2 02/07/2017    WBC 5.7 01/13/2017    HGB 10.1* 02/07/2017    HGB 11.6* 01/13/2017     02/07/2017     01/13/2017       Assessment/Plan: Julisa Champion is a 66 year old female s/p stealth assisted T1-T2 transforaminal interbody fusion with bilateral synthetic interbody cages, SPO T1-T2, removal/replacement of T2-T3 instrumentation, and T1-T2 posterolateral arthrodesis with local harvest autograft and allograft with Dr. Wolfe and Dr. Morocho. [place] [facility] [place].     - Encourage frequent voiding attempts this  AM, bladder scan  - Will get patient up to chair today with PT.     Activity: Up with assist. At minimum, patient must be up to a chair on POD 1. To bending, twisting, or lifting > 10 pounds. Cervical collar ([place] J) at all times except for hygiene. No head of bed restrictions.  Weight bearing status: WBAT BUE and BLE.  Antibiotics: Ancef x 24 hours. (completed)  Diet: Begin with clear fluids and progress diet as tolerated.   DVT prophylaxis: Ambulatory. SCDs.  Bracing/Splinting: Transition to [place] J on POD 1 with Orthotics assistance.  Wound Care: Aquacel dressing x 7 days, reinforce PRN.  Drains: Document output per shift, discontinue when <30 cc/shift. Anticipate removal later today (POD#2).   Pain management: DC PCA today. Switch to PO with IV for breakthrough only.   X-rays: Standing AP/Lat full spine x-rays prior to discharge. Will obtain today, POD#2.  Physical Therapy: Mobilization, ROM, ADL's.   Occupational Therapy: ADL's  Labs: Trend Hgb on POD #1, 2, 3.   Consults: PT, OT. ICU, Hospitalist, Orthotics, appreciate assistance in caring for this patient.   Follow-up: Clinic with Dr. Wolfe as follows:   Future Appointments  Date Time Provider Department Center   2/7/2017 11:30 AM Thu Galindo, PT [facility] [place]   2/7/2017 3:00 PM Thu Galindo, [facility] [place]   2/8/2017 8:00 AM Tete Wheeler OT [facility] [place]   3/23/2017 11:45 AM Isauro Wolfe MD [facility]   4/20/2017 10:30 AM Isauro Wolfe MD [facility]       Disposition: Pending progress with therapies, pain control on orals, and medical stability, anticipate discharge to Home on POD #3-4.    Roxy eLma MD  Orthopedic Surgery Resident R4  [phone]

## 2017-02-08 NOTE — PLAN OF CARE
Problem: Goal Outcome Summary  Goal: Goal Outcome Summary  Outcome: Improving  A/O x4. VSS. Pt given 500cc NS bolus at 1600 for BP of 92/42. Last /42. Pt ambulated in asher with PT, sat up in chair x2. Pt tolerating reg diet, denies any difficulty swallowing. Cervical collar in place. Dsg to posterior neck CDI. Serosanguious drainage from hemovac. Neuro's intact. Pain managed with dilaudid PCA, valium for spasms. Pt had gay dc'd at 0830 this am. Pt has not voided yet. PVR @ 1600 was 57cc, 176cc@ 1845, MD notified, pt given another 500cc bolus of NS. Will do bladder scan again after bolus complete, pt feels no urge to void. Pt in good spirits,  at bedside. Continue POC.      2230  Bladder scan 419cc, straight cathed for 450cc @ 2230. Pt wheezing, coughing, O2 sats 88-90% on RA, increased when up to bathroom to attempt to void, MD notified, nebs order.

## 2017-02-08 NOTE — PLAN OF CARE
Problem: Goal Outcome Summary  Goal: Goal Outcome Summary  Outcome: Therapy, progress toward functional goals as expected  Pt spouse assisted with supine to/from sitting.  Pt needed assistance with lifting LEs into the bed.  Sit to/from standing from EOB with CGA x 1 and 4-ww. Pt ambulated 250' with 4-ww and CGA to SBA x 1.  Pt ascended/descended 3 steps with 1 railing and CGA x 1. Recommend discharge home with spouse for assistance.

## 2017-02-08 NOTE — PROVIDER NOTIFICATION
"Notified MD at 2230    Spoke with: Dr. Salomon  Orders were obtained.  Comments: pt wheezing, coughing, c/o feeling \"tight\" . States she uses home nebs infrequently, prn. Nebs ordered.  "

## 2017-02-08 NOTE — PROGRESS NOTES
"[facility] Internal Medicine Progress Note            Interval History:   Record reviewed.  Seen with RN.  Adequate pain control.  Plan to transition off PCA to po oxycodone.  No delirium.  Ambulated, tolerated stairs 2/7, up to BR, chair this AM without LH.  Neb for upper airway congestion.  Cough with inability to clear secretions.  No CP, SOB.  Weaned O2 over night.  Tolerating full liquids without cough/choking.  No nausea, reflux, abd pain.  Passing flatus.  Bm 3 days ago.  SC last night for urine retention.              Medications:   All medications reviewed today          Physical Exam:   Blood pressure 106/48, pulse 96, temperature 98.9  F (37.2  C), temperature source Oral, resp. rate 14, height 1.651 m (5' 5\"), weight 114.034 kg (251 lb 6.4 oz), SpO2 95 %.    Intake/Output Summary (Last 24 hours) at 02/08/17 1410  Last data filed at 02/08/17 1015   Gross per 24 hour   Intake   1400 ml   Output    620 ml   Net    780 ml       General:  Alert.  Appropriate.  Off 02.  No distress.     Heent:      Neck:    Skin:    Chest:  Clear except faint upper airway rhonchi.     Cardiac:  Reg without gallop, murmur.  No JVD.     Abdomen:  Non distended, soft, non tender.  BS normal.     Extremities:  Perfused.  No edema, calf, thigh tenderness.     Neuro:            Data:     Results for orders placed or performed during the hospital encounter of 02/06/17 (from the past 24 hour(s))   Basic metabolic panel   Result Value Ref Range    Sodium 136 133 - 144 mmol/L    Potassium 3.6 3.4 - 5.3 mmol/L    Chloride 104 94 - 109 mmol/L    Carbon Dioxide 28 20 - 32 mmol/L    Anion Gap 4 3 - 14 mmol/L    Glucose 107 (H) 70 - 99 mg/dL    Urea Nitrogen 17 7 - 30 mg/dL    Creatinine 1.19 (H) 0.52 - 1.04 mg/dL    GFR Estimate 45 (L) >60 mL/min/1.7m2    GFR Estimate If Black 55 (L) >60 mL/min/1.7m2    Calcium 8.0 (L) 8.5 - 10.1 mg/dL   CBC with platelets   Result Value Ref Range    WBC 7.6 4.0 - 11.0 10e9/L    RBC Count 3.18 (L) " 3.8 - 5.2 10e12/L    Hemoglobin 9.4 (L) 11.7 - 15.7 g/dL    Hematocrit 30.6 (L) 35.0 - 47.0 %    MCV 96 78 - 100 fl    MCH 29.6 26.5 - 33.0 pg    MCHC 30.7 (L) 31.5 - 36.5 g/dL    RDW 14.3 10.0 - 15.0 %    Platelet Count 188 150 - 450 10e9/L   Magnesium   Result Value Ref Range    Magnesium 2.2 1.6 - 2.3 mg/dL   Phosphorus   Result Value Ref Range    Phosphorus 4.4 2.5 - 4.5 mg/dL                Assessment and Plan:   1)  S/P Stealth Assisted Extension Of Fusion Thoracic 1-Thoracic 2, Bautista Sanchez Osteotomy Thoracic 1-2, Interbody Fusion Thoracic 1-2.  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  Adequate pain control.    2)  S/P T2-pelvis PLF 8/15. Repaired dural tear.  Indication scoliosis, flatback syndrome, lumbar stenosis with neurogenic claudication. Course complicated by opiate related delirium.   3)  Acute blood loss anemia, adequate.  4)  HTN, adequate off lisinopril.  Bolus for hypotension 2/7.  5)  Constipation.    7)  LETA, mild.  Noted last admit - potentially related to anesthesia, lisinopril, mild hypotension.   8)  Hypoxemia c/w opiate related sedation with hypoventilation, atelectasis in conjunction with retained upper airway secretions.    9)  Urine retention, aggravated by opiates, constipation.     PLAN:  1)  Adjust IV fluids.  2)  Oral opiates, scheduled tylenol.  Monitor for delirium.  3)  IS, increase bowel meds. Supp later if no BM.   Mobilize. SC prn.  4)  Scheduled and prn nebs. 5)  Trend labs, monitor clinically.   ADD: added in error.         Attestation:  I have reviewed today's vital signs, notes, medications, labs and imaging.     Edd Abreu MD

## 2017-02-09 ENCOUNTER — APPOINTMENT (OUTPATIENT)
Dept: PHYSICAL THERAPY | Facility: CLINIC | Age: 67
DRG: 457 | End: 2017-02-09
Attending: ORTHOPAEDIC SURGERY
Payer: MEDICARE

## 2017-02-09 ENCOUNTER — APPOINTMENT (OUTPATIENT)
Dept: GENERAL RADIOLOGY | Facility: CLINIC | Age: 67
DRG: 457 | End: 2017-02-09
Attending: PHYSICIAN ASSISTANT
Payer: MEDICARE

## 2017-02-09 ENCOUNTER — APPOINTMENT (OUTPATIENT)
Dept: OCCUPATIONAL THERAPY | Facility: CLINIC | Age: 67
DRG: 457 | End: 2017-02-09
Attending: ORTHOPAEDIC SURGERY
Payer: MEDICARE

## 2017-02-09 ENCOUNTER — APPOINTMENT (OUTPATIENT)
Dept: GENERAL RADIOLOGY | Facility: CLINIC | Age: 67
DRG: 457 | End: 2017-02-09
Attending: INTERNAL MEDICINE
Payer: MEDICARE

## 2017-02-09 LAB
ANION GAP SERPL CALCULATED.3IONS-SCNC: 7 MMOL/L (ref 3–14)
BACTERIA SPEC CULT: NO GROWTH
BUN SERPL-MCNC: 11 MG/DL (ref 7–30)
CALCIUM SERPL-MCNC: 8.2 MG/DL (ref 8.5–10.1)
CHLORIDE SERPL-SCNC: 108 MMOL/L (ref 94–109)
CO2 SERPL-SCNC: 27 MMOL/L (ref 20–32)
CREAT SERPL-MCNC: 0.75 MG/DL (ref 0.52–1.04)
GFR SERPL CREATININE-BSD FRML MDRD: 77 ML/MIN/1.7M2
GLUCOSE SERPL-MCNC: 98 MG/DL (ref 70–99)
HGB BLD-MCNC: 9.2 G/DL (ref 11.7–15.7)
Lab: NORMAL
MAGNESIUM SERPL-MCNC: 2.2 MG/DL (ref 1.6–2.3)
MICRO REPORT STATUS: NORMAL
POTASSIUM SERPL-SCNC: 3.7 MMOL/L (ref 3.4–5.3)
SODIUM SERPL-SCNC: 142 MMOL/L (ref 133–144)
SPECIMEN SOURCE: NORMAL
WBC # BLD AUTO: 6.4 10E9/L (ref 4–11)

## 2017-02-09 PROCEDURE — 94640 AIRWAY INHALATION TREATMENT: CPT | Mod: 76

## 2017-02-09 PROCEDURE — 85048 AUTOMATED LEUKOCYTE COUNT: CPT | Performed by: INTERNAL MEDICINE

## 2017-02-09 PROCEDURE — A9270 NON-COVERED ITEM OR SERVICE: HCPCS | Mod: GY | Performed by: INTERNAL MEDICINE

## 2017-02-09 PROCEDURE — 83735 ASSAY OF MAGNESIUM: CPT | Performed by: INTERNAL MEDICINE

## 2017-02-09 PROCEDURE — 40000193 ZZH STATISTIC PT WARD VISIT: Performed by: PHYSICAL THERAPIST

## 2017-02-09 PROCEDURE — 85018 HEMOGLOBIN: CPT | Performed by: INTERNAL MEDICINE

## 2017-02-09 PROCEDURE — 25000132 ZZH RX MED GY IP 250 OP 250 PS 637: Mod: GY | Performed by: ORTHOPAEDIC SURGERY

## 2017-02-09 PROCEDURE — 72080 X-RAY EXAM THORACOLMB 2/> VW: CPT

## 2017-02-09 PROCEDURE — 40000275 ZZH STATISTIC RCP TIME EA 10 MIN

## 2017-02-09 PROCEDURE — 25000128 H RX IP 250 OP 636: Performed by: INTERNAL MEDICINE

## 2017-02-09 PROCEDURE — 94640 AIRWAY INHALATION TREATMENT: CPT

## 2017-02-09 PROCEDURE — 25000132 ZZH RX MED GY IP 250 OP 250 PS 637: Mod: GY | Performed by: INTERNAL MEDICINE

## 2017-02-09 PROCEDURE — 36415 COLL VENOUS BLD VENIPUNCTURE: CPT | Performed by: INTERNAL MEDICINE

## 2017-02-09 PROCEDURE — 97530 THERAPEUTIC ACTIVITIES: CPT | Mod: GO

## 2017-02-09 PROCEDURE — 97535 SELF CARE MNGMENT TRAINING: CPT | Mod: GO

## 2017-02-09 PROCEDURE — 80048 BASIC METABOLIC PNL TOTAL CA: CPT | Performed by: INTERNAL MEDICINE

## 2017-02-09 PROCEDURE — 97116 GAIT TRAINING THERAPY: CPT | Mod: GP | Performed by: PHYSICAL THERAPIST

## 2017-02-09 PROCEDURE — 25000132 ZZH RX MED GY IP 250 OP 250 PS 637: Mod: GY | Performed by: PHYSICIAN ASSISTANT

## 2017-02-09 PROCEDURE — A9270 NON-COVERED ITEM OR SERVICE: HCPCS | Mod: GY | Performed by: ORTHOPAEDIC SURGERY

## 2017-02-09 PROCEDURE — 99232 SBSQ HOSP IP/OBS MODERATE 35: CPT | Performed by: INTERNAL MEDICINE

## 2017-02-09 PROCEDURE — 97530 THERAPEUTIC ACTIVITIES: CPT | Mod: GP | Performed by: PHYSICAL THERAPIST

## 2017-02-09 PROCEDURE — 12000008 ZZH R&B INTERMEDIATE UMMC

## 2017-02-09 PROCEDURE — A9270 NON-COVERED ITEM OR SERVICE: HCPCS | Mod: GY | Performed by: PHYSICIAN ASSISTANT

## 2017-02-09 PROCEDURE — 25000308 HC RX OP HPI UCR WEL MED 250 IP 250: Performed by: INTERNAL MEDICINE

## 2017-02-09 PROCEDURE — 71020 XR CHEST 2 VW: CPT

## 2017-02-09 PROCEDURE — 40000133 ZZH STATISTIC OT WARD VISIT

## 2017-02-09 RX ORDER — AMOXICILLIN 250 MG
1-2 CAPSULE ORAL DAILY PRN
Qty: 60 TABLET | Refills: 2 | Status: SHIPPED
Start: 2017-02-09

## 2017-02-09 RX ORDER — GABAPENTIN 100 MG/1
100 CAPSULE ORAL 3 TIMES DAILY
Qty: 90 CAPSULE | Refills: 0 | Status: SHIPPED
Start: 2017-02-09 | End: 2017-07-27

## 2017-02-09 RX ORDER — LEVOFLOXACIN 750 MG/1
750 TABLET, FILM COATED ORAL EVERY 24 HOURS
Status: DISCONTINUED | OUTPATIENT
Start: 2017-02-09 | End: 2017-02-10 | Stop reason: HOSPADM

## 2017-02-09 RX ORDER — OXYCODONE HYDROCHLORIDE 5 MG/1
5-10 TABLET ORAL EVERY 4 HOURS PRN
Qty: 100 TABLET | Refills: 0 | Status: SHIPPED | OUTPATIENT
Start: 2017-02-09 | End: 2017-02-20

## 2017-02-09 RX ADMIN — NORTRIPTYLINE HYDROCHLORIDE 10 MG: 10 CAPSULE ORAL at 22:34

## 2017-02-09 RX ADMIN — POLYETHYLENE GLYCOL 3350 17 G: 17 POWDER, FOR SOLUTION ORAL at 09:10

## 2017-02-09 RX ADMIN — OXYCODONE HYDROCHLORIDE 10 MG: 5 TABLET ORAL at 11:54

## 2017-02-09 RX ADMIN — OXYCODONE HYDROCHLORIDE 10 MG: 5 TABLET ORAL at 06:11

## 2017-02-09 RX ADMIN — GUAIFENESIN 600 MG: 600 TABLET, EXTENDED RELEASE ORAL at 20:55

## 2017-02-09 RX ADMIN — GUAIFENESIN 600 MG: 600 TABLET, EXTENDED RELEASE ORAL at 09:08

## 2017-02-09 RX ADMIN — ALBUTEROL SULFATE 2.5 MG: 2.5 SOLUTION RESPIRATORY (INHALATION) at 19:21

## 2017-02-09 RX ADMIN — GABAPENTIN 100 MG: 100 CAPSULE ORAL at 14:29

## 2017-02-09 RX ADMIN — OXYCODONE HYDROCHLORIDE 10 MG: 5 TABLET ORAL at 01:46

## 2017-02-09 RX ADMIN — SODIUM CHLORIDE: 9 INJECTION, SOLUTION INTRAVENOUS at 10:12

## 2017-02-09 RX ADMIN — ALBUTEROL SULFATE 2.5 MG: 2.5 SOLUTION RESPIRATORY (INHALATION) at 16:07

## 2017-02-09 RX ADMIN — GABAPENTIN 100 MG: 100 CAPSULE ORAL at 09:08

## 2017-02-09 RX ADMIN — SENNOSIDES AND DOCUSATE SODIUM 2 TABLET: 8.6; 5 TABLET ORAL at 09:08

## 2017-02-09 RX ADMIN — ALBUTEROL SULFATE 2.5 MG: 2.5 SOLUTION RESPIRATORY (INHALATION) at 12:42

## 2017-02-09 RX ADMIN — LEVOFLOXACIN 750 MG: 750 TABLET, FILM COATED ORAL at 20:54

## 2017-02-09 RX ADMIN — POLYETHYLENE GLYCOL 3350 17 G: 17 POWDER, FOR SOLUTION ORAL at 20:56

## 2017-02-09 RX ADMIN — DIAZEPAM 5 MG: 5 TABLET ORAL at 00:14

## 2017-02-09 RX ADMIN — OXYCODONE HYDROCHLORIDE 10 MG: 5 TABLET ORAL at 09:16

## 2017-02-09 RX ADMIN — SODIUM CHLORIDE: 9 INJECTION, SOLUTION INTRAVENOUS at 00:14

## 2017-02-09 RX ADMIN — BISACODYL 10 MG: 10 SUPPOSITORY RECTAL at 10:44

## 2017-02-09 RX ADMIN — ACETAMINOPHEN 650 MG: 325 TABLET ORAL at 18:04

## 2017-02-09 RX ADMIN — DIAZEPAM 5 MG: 5 TABLET ORAL at 18:04

## 2017-02-09 RX ADMIN — SODIUM CHLORIDE: 9 INJECTION, SOLUTION INTRAVENOUS at 18:09

## 2017-02-09 RX ADMIN — VENLAFAXINE 225 MG: 75 TABLET ORAL at 20:54

## 2017-02-09 RX ADMIN — ACETAMINOPHEN 975 MG: 325 TABLET ORAL at 11:55

## 2017-02-09 RX ADMIN — ALBUTEROL SULFATE 2.5 MG: 2.5 SOLUTION RESPIRATORY (INHALATION) at 08:06

## 2017-02-09 NOTE — PLAN OF CARE
Problem: Goal Outcome Summary  Goal: Goal Outcome Summary  Outcome: Therapy, progress toward functional goals as expected  Pt agreeable to functional mobility with some encouragement. Supine to sit with SBA. Sit to stand with 4ww and CGA-SBA. Stand to sit with no device and CGA- SBA. Ambulated 250' with 4WW and CGA- SBA. Anticipate d/c home with assist from spouse.

## 2017-02-09 NOTE — PLAN OF CARE
Problem: Goal Outcome Summary  Goal: Goal Outcome Summary  OT:  assists patient with bed mobility. Patient ambulated in hallway using 4ww with SBA. Completed toilet transfer and task with SBA.  assists with all ADLs at home and they have a good routine in place. Patient and  declined need for further OT services, will complete orders. Recommend discharge home with assist.     Occupational Therapy Discharge Summary    Reason for therapy discharge:    All goals and outcomes met, no further needs identified.    Progress towards therapy goal(s). See goals on Care Plan in [facility] electronic health record for goal details.  Most goals met- assists with all ADLs, patient and  reported no further need for OT services    Therapy recommendation(s):    No further therapy is recommended.   Home with assist.

## 2017-02-09 NOTE — PLAN OF CARE
Problem: Goal Outcome Summary  Goal: Goal Outcome Summary  Outcome: Improving  A/O x 4  Up with SBA the the bathroom with walker. Pain tolerable with oxycodone and valium. Voiding well. Dressing to spine c/d/i. Hot ice machine on. CMS and neuro's intact.  sleeping at the bedside. VSS. Encouraged incentive spirometer. Able to make needs known. Cont to assess.

## 2017-02-09 NOTE — DISCHARGE SUMMARY
ORTHOPAEDIC DISCHARGE SUMMARY     Date of Admission: 2/6/2017  Date of Discharge: 2/10/2017 11:33 AM  Disposition: Home  Staff Physician: Isauro Wolfe MD  Primary Care Provider: Other Clinic, Md    DISCHARGE DIAGNOSIS:    1.  T1-T2 subluxation with myeloradiculopathy.    2.  Adult scoliosis.    3.  History of spinal fusion.      PROCEDURES: Procedure(s):  Extension of fusion to T1, pedicle screw instrumentation T1, decompression bilaterally T1-T2, Bautista Sanchez osteotomy T1-T2, placement intervertebral device T1-T2, image-guided surgery, posterior spinal fusion T1-T2 on 2/6/2017    BRIEF HISTORY:  Julisa Champion is an adult female with a history of a previous posterior spinal fusion in the August timeframe of this year and this was done from T2 to the pelvis.  Initially she did quite well and has had marked improvement of her lumbar stenosis symptoms and her overall sagittal imbalance symptoms.  However, on followup, she presented back with a T1-T2 proximal junctional kyphosis, with resultant spinal cord compression and myeloradiculopathy.  Initially she was unwilling to undergo corrective surgery; however, she has had progressive pain and difficulties meriting surgical intervention.  Of note is Dr. Zulay Morocho was a cosurgeon for this case because of the need for bilateral decompression and a bilateral operative team along with the complexity of the case given the patient's size, previous surgery and the myeloradiculopathy.      HOSPITAL COURSE:    Surgery was uncomplicated. Julisa Champion has done well post-operatively. Medicine was consulted post operatively to aid in management of medical comorbidities. See final recommendations below. The patient received routine nursing cares and is medically stable. Vital signs are stable. The patient is tolerating a regular diet without GI distress/nausea or vomiting. Voiding spontaneously. All PT/OT goals have been met for safe mobility. Pain is now  controlled on oral medications which will be available on discharge. Stool softeners have been used while taking pain medications to help prevent constipation. Julisa Champion is deemed medically safe to discharge.     Antibiotics:  Ancef given periop and 24 hours postop.   DVT prophylaxis:  Ambulatory.  PT Progress:  Has met PT/OT goals for safe mobility.    Pain Meds:  Weaned off all IV pain meds by discharge.  Inpatient Events: No significant events or complications.     Discharge orders and instructions as below.    FOLLOWUP:    Future Appointments  Date Time Provider Department Center   3/23/2017 11:45 AM Isauro Wolfe MD [facility]   4/20/2017 10:30 AM Isauro Wolfe MD [facility]     Appointments on [facility]. Call [phone] if you haven't heard regarding these appointments within 7 days of discharge.    PLANNED DISCHARGE ORDERS:     DVT Prophylaxis: Ambulatory      Discharge Medication List as of 2/10/2017 10:43 AM      START taking these medications    Details   albuterol (PROVENTIL) (5 MG/ML) 0.5% neb solution Take 0.5 mLs (2.5 mg) by nebulization every 4 hours as needed for wheezing or shortness of breath / dyspnea, Disp-60 vial, R-0, E-Prescribe      !! albuterol (2.5 MG/3ML) 0.083% neb solution Take 1 vial (2.5 mg) by nebulization 4 times daily, Disp-1 Box, R-0, E-Prescribe      guaiFENesin (MUCINEX) 600 MG 12 hr tablet Take 1 tablet (600 mg) by mouth 2 times daily, Disp-20 tablet, R-0, E-Prescribe      levofloxacin (LEVAQUIN) 750 MG tablet Take 1 tablet (750 mg) by mouth every 24 hours, Disp-6 tablet, R-0, E-Prescribe      oxyCODONE (ROXICODONE) 5 MG IR tablet Take 1-2 tablets (5-10 mg) by mouth every 4 hours as needed for moderate to severe pain, Disp-100 tablet, R-0, Local Print      gabapentin (NEURONTIN) 100 MG capsule Take 1 capsule (100 mg) by mouth 3 times daily, Disp-90 capsule, R-0, Fax      diazepam (VALIUM) 5 MG tablet Take 1 tablet (5  mg) by mouth every 6 hours as needed for anxiety or other (muscle spasms), Disp-20 tablet, R-0, Local Print       !! - Potential duplicate medications found. Please discuss with provider.      CONTINUE these medications which have CHANGED    Details   nortriptyline (PAMELOR) 10 MG capsule Take 1 capsule (10 mg) by mouth At Bedtime, Disp-30 capsule, R-0, E-Prescribe      !! senna-docusate (SENOKOT-S;PERICOLACE) 8.6-50 MG per tablet Take 1-2 tablets by mouth daily as needed for constipation, Disp-60 tablet, R-2, Fax      !! senna-docusate (SENOKOT-S;PERICOLACE) 8.6-50 MG per tablet Take 1-2 tablets by mouth 2 times daily as needed for constipation, Disp-100 tablet, R-0, E-Prescribe       !! - Potential duplicate medications found. Please discuss with provider.      CONTINUE these medications which have NOT CHANGED    Details   !! albuterol (2.5 MG/3ML) 0.083% neb solution Take 1 vial (2.5 mg) by nebulization every 4 hours as needed for shortness of breath / dyspnea or wheezing, Disp-25 vial, R-0, Historical      polyethylene glycol (MIRALAX/GLYCOLAX) packet Take 17 g by mouth daily as needed for constipation, Disp-30 packet, R-1, Historical      order for DME Equipment being ordered: Walker Wheels () and Walker ()  Treatment Diagnosis: Gait instabilityDisp-1 each, R-0, Local Print      acetaminophen (TYLENOL) 500 MG tablet Take 2 tablets (1,000 mg) by mouth every 6 hours as needed for mild pain or fever, Historical      Venlafaxine HCl (EFFEXOR PO) Take 225 mg by mouth every evening , Historical       !! - Potential duplicate medications found. Please discuss with provider.      STOP taking these medications       traMADol (ULTRAM) 50 MG tablet Comments:   Reason for Stopping:         lisinopril-hydrochlorothiazide (PRINZIDE,ZESTORETIC) 20-12.5 MG per tablet Comments:   Reason for Stopping:         methocarbamol (ROBAXIN) 750 MG tablet Comments:   Reason for Stopping:                 Discharge Procedure  Orders  Reason for your hospital stay   Order Comments: You were in the hospital to undergo Extension of fusion to T1, pedicle screw instrumentation T1, decompression bilaterally T1-T2, Bautista Sanchez osteotomy T1-T2, placement intervertebral device T1-T2, image-guided surgery, posterior spinal fusion T1-T2.       Adult [facility] Follow-up and recommended labs and tests   Order Comments: Follow up with Dr. Wolfe on 3/23/2017 at 11:45 AM at [facility] ([address] [place] [address]). Call [phone] to schedule a follow-up appointment at this location with your provider.     Activity   Order Comments: Your activity upon discharge: Up ad elver, No bending, twisting, or lifting > 10 pounds. Cervical collar ([place] J) at all times except for hygiene. No head of bed restrictions.   Order Specific Question Answer Comments   Is discharge order? Yes      Discharge Instructions   Order Comments: Postoperative Instructions - Spinal fusion   [place] [address]     You have had a spinal fusion. You have a dressing called Aquacel on your incision which can be worn for up to 7 days, and it can be worn in the shower. After the Aquacel has been removed, you do not need a dressing. There are steri strips directly over the incision. Those may stay in place until they fall off, which can take up to 2 weeks. It is okay to shower and wash gently with soap and water. Do not soak in the bath. No pools, hot tubs, or lakes for 6 weeks.     For postoperative pain control, I have prescribed a narcotic medication.  This should be taken for the first few weeks following surgery, but as soon as you are able, transition to an over-the-counter type medication such as Tylenol.  You may not take non-steroidal anti-inflammatory medications (eg: Advil, Ibuprofen, Aleve, others) for the first 3 months after surgery as it interferes with bone healing.  While taking the narcotic medication, there are several precautions that you must adhere to. These medications have numerous side effects including nausea, constipation, and drowsiness. If you experience nausea, this may be relieved by taking the medication with food or a light meal. To avoid constipation, please use an over-the-counter stool softener or drink lots of water and eat fruits and vegetables. Avoid operating heavy machinery or driving an automobile while on narcotic medications.      You will be seen in the clinic at 6 weeks following surgery. You will not need to attend physical therapy during this time. You can focus on cardiovascular fitness by walking as much as you can tolerate. Avoid bending, lifting, and twisting. Your weight lifting restriction is 10 pounds until your first follow-up appointment.      When you get home, you may resume your normal diet as tolerated. You may not be very hungry but try to eat small healthy meals to help you heal. Remember to drink plenty of water/fluids to help keep you hydrated.    After surgery, you may have a sensitive scar.  When the dressing has been removed, you may massage the scar to decrease sensitivity and help break down scar tissue. Do this up to 4 times per day.    Please call or return if you experience the following:  Fevers (temperature greater than 100.4 degrees Fahrenheit)  Pain that is getting worse or does not respond to pain medications  Drainage from your wound  Increasing redness about the wound  Any other worrisome symptoms    You may reach the clinic by dialing [phone].  After hours, you may reach the resident on call by dialing [phone].     Discharge Instructions   Order Comments: C with RN - check clinically, pulmonary status/sat, VS, Hgb, BMP - update PMD  Use incentive spirometer and flutter valve  Call primary MD to arrange follow up - decision when to resume lisinopril/HCTZ  Wean oxycodone as able.     Diet   Order  Comments: Follow this diet upon discharge: Regular   Order Specific Question Answer Comments   Is discharge order? Yes            Logan Martini MD 2/9/2017  Orthopaedic Surgery Resident, PGY-4  Pager: [phone]

## 2017-02-09 NOTE — PLAN OF CARE
Problem: Goal Outcome Summary  Goal: Goal Outcome Summary  Outcome: Improving  A/O x4.  HR tachy 100-110's, denies CP. T 99.3 today Pt up with SBA and walker, ambulated in asher with PT, sat up in chair for meals. BS hypo, given suppository with large results, expelled large amount of gas. Tolerating reg diet, denies any difficulty swallowing. [place]-J collar in place. Neuro's intact. IVF infusing, pt given 500cc bolus NS this am, ordered by MD(see note). Pain managed with oxycodone, scheduled tylenol. Dsg to posterior neck CDI. LS expiratory wheezing, has scheduled nebs, productive cough, pt is swallowing sputum. Pt in good spirits, hopes to be able to discharge in am. Continue POC.

## 2017-02-09 NOTE — PROGRESS NOTES
"Orthopaedic Surgery Progress Note    E: Tmax 101.2 overnight, +cough/wheezing overnight requiring nebs. Still requiring intermittent straight cath for urinary retention.     S: Pain well controlled this AM on orals. Denies numbness or tingling to bilateral hands/upper extremities or legs. Denies chest pain, shortness of breath, nausea, or vomiting although patient is complaining of \"bronchitis\" this AM. Unable to void as above, wants to get up to bathroom this AM and try again. Passing flatus. No BM. No abd pain. No nausea. Tolerating diet. Working with PT. Robby WILKS in place.     O:  Filed Vitals:    02/08/17 2133 02/08/17 2214 02/09/17 0245 02/09/17 0500   BP: 130/46 141/56 125/66    Pulse:   101    Temp: 99.7  F (37.6  C) 99.5  F (37.5  C) 99.2  F (37.3  C)    TempSrc: Oral Oral Oral    Resp: 18 18 22    Height:       Weight:    117.935 kg (260 lb)   SpO2: 95% 95% 91%          Exam:  Gen: No acute distress, resting comfortably in bed. Conversant. [place] J in place.   Resp: Non-labored breathing on room air, mild wheezing noted.  Back: Aquacel dressing in place, Drain site dressing is c/d/i.   BUE:  - 5/5 EPL  - 4+/5 IO  - 5/5   - 5/5 Deltoids  - SILT in med/rad/uln/ax.  - Hands warm and well perfused.  BLE:  - 5/5 EHL/FHL/TA/GSC/Quads/Abductors.   - SILT in L3-S1, symmetric bilaterally.  - Feet wwp.     Lab Results   Component Value Date    WBC 6.4 02/09/2017    WBC 7.6 02/08/2017    HGB 9.2* 02/09/2017    HGB 9.4* 02/08/2017     02/08/2017     02/07/2017       Assessment/Plan: Julisa Champion is a 66 year old female s/p stealth assisted T1-T2 transforaminal interbody fusion with bilateral synthetic interbody cages, SPO T1-T2, removal/replacement of T2-T3 instrumentation, and T1-T2 posterolateral arthrodesis with local harvest autograft and allograft with Dr. Wolfe and Dr. Morocho. Doing well post-op.    - Appreciate continued medicine input regarding wheezing/cough.  - Encourage frequent voiding " attempts this AM, bladder scan    Activity: Up with assist. No bending, twisting, or lifting > 10 pounds. Cervical collar ([place] J) at all times except for hygiene. No head of bed restrictions.  Weight bearing status: WBAT BUE and BLE.  Antibiotics: Ancef x 24 hours, completed.  Diet: Begin with clear fluids and progress diet as tolerated.   DVT prophylaxis: Ambulatory. SCDs.  Bracing/Splinting: Transition to [place] J on POD 1 with Orthotics assistance.  Wound Care: Aquacel dressing x 7 days, reinforce PRN.  Drains: Drain removed on POD 2.   Pain management: Orals PRN  X-rays: Standing AP/Lat full spine x-rays prior to discharge. Please obtain radiographs today,  Physical Therapy: Mobilization, ROM, ADL's.   Occupational Therapy: ADL's  Labs: Stable Acute blood loss anemia at 9.2.  Consults: PT, OT. Hospitalist, Orthotics, appreciate assistance in caring for this patient.   Follow-up: Clinic with Dr. Wolfe as follows:   Future Appointments  Date Time Provider Department Center   2/7/2017 11:30 AM Thu Galindo, PT [facility]   2/7/2017 3:00 PM Thu Galindo, PT [facility]   2/8/2017 8:00 AM Tete Wheeler OT [facility] [place]   3/23/2017 11:45 AM Isauro Wolfe MD [facility]   4/20/2017 10:30 AM Isauro Wolfe MD [facility]       Disposition: Pending progress with therapies, pain control on orals, and medical stability, anticipate discharge to Home later today vs Friday.    Logan Martini MD  Orthopaedic Surgery Resident, PGY-4  Pager: [phone]

## 2017-02-09 NOTE — PLAN OF CARE
Problem: Goal Outcome Summary  Goal: Goal Outcome Summary  Outcome: No Change  Pt tachycardic and febrile during shift. Sepsis triggered, lactic acid 1.4. Denies chest pain, SOB or difficulty breathing.CMS/neuros intact. Lung sounds with expiratory wheezes. Tolerating regular diet and fluids without n/v. Voiding without difficulty. Bowel sounds active, passing flatus, no BM. Up with SBA and walker, ambulating to bathroom. PCD's in place. Pain managed with PRN oxycodone. Dressings CDI. PIV infusing. Pt able to make needs known, continue with POC.

## 2017-02-09 NOTE — PLAN OF CARE
Problem: Goal Outcome Summary  Goal: Goal Outcome Summary  Sit to/from stand with 4WW and SBA. Ambulated 300' with 4WW and SBA. Ascend/descend 3 stairs with 1 rail and SBA. Sit to supine with SBA. Recommend discharge home with assist from spouse at this time.             Comments:   Physical Therapy Discharge Summary    Reason for therapy discharge:    All goals and outcomes met, no further needs identified.    Progress towards therapy goal(s). See goals on Care Plan in [facility] electronic health record for goal details.  Goals met    Therapy recommendation(s):    No further therapy is recommended.

## 2017-02-09 NOTE — PROGRESS NOTES
"[facility] Internal Medicine Progress Note            Interval History:   Record reviewed.  Seen with RN.  Fever spike to 101.2 last night.  Mild chilling.  On scheduled and prn nebs.  Interval decrease in degree of upper airway congestion.  Secretions seem to be mobilizing with nebs but \"swallowing\".  Tolerating regular diet without noted pc cough/choking.  No CP or dyspnea except occas difficulty taking deep inspiratory effort.  No nausea, reflux, abd pain.  Mild bloating.  BM  4 days ago.  No supp 2/8 as ordered.  Voiding OK.   Adequate pain control.  Ambulated in asher without LH.  No O2 over night.             Medications:   All medications reviewed today          Physical Exam:   Blood pressure 135/65, pulse 111, temperature 99.3  F (37.4  C), temperature source Oral, resp. rate 18, height 1.651 m (5' 5\"), weight 117.935 kg (260 lb), SpO2 98 %.    Intake/Output Summary (Last 24 hours) at 02/08/17 1410  Last data filed at 02/08/17 1015   Gross per 24 hour   Intake   1400 ml   Output    620 ml   Net    780 ml          General:  Alert.  Appropriate.  Off 02.  No distress.     Heent:      Neck:    Skin:    Chest:  Clear presently (after neb) without rales, rhonchi, bronchospasm.    Cardiac:  Reg without gallop, murmur.  No JVD.     Abdomen:  Mildly distended, soft, non tender.  BS normal.     Extremities:  Perfused.  No edema, calf, thigh tenderness.     Neuro:            Data:     Results for orders placed or performed during the hospital encounter of 02/06/17 (from the past 24 hour(s))   UA with Microscopic   Result Value Ref Range    Color Urine Light Yellow     Appearance Urine Clear     Glucose Urine Negative NEG mg/dL    Bilirubin Urine Negative NEG    Ketones Urine Negative NEG mg/dL    Specific Gravity Urine 1.006 1.003 - 1.035    Blood Urine Negative NEG    pH Urine 5.0 5.0 - 7.0 pH    Protein Albumin Urine Negative NEG mg/dL    Urobilinogen mg/dL Normal 0.0 - 2.0 mg/dL    Nitrite Urine Negative NEG "    Leukocyte Esterase Urine Negative NEG    Source Midstream Urine     WBC Urine 2 0 - 2 /HPF    RBC Urine 1 0 - 2 /HPF    Squamous Epithelial /HPF Urine <1 0 - 1 /HPF    Mucous Urine Present (A) NEG /LPF   Urine Culture Aerobic Bacterial   Result Value Ref Range    Specimen Description Midstream Urine     Special Requests Specimen received in preservative     Culture Micro Pending     Micro Report Status Pending    Lactic acid level STAT   Result Value Ref Range    Lactic Acid 1.4 0.7 - 2.1 mmol/L   Basic metabolic panel   Result Value Ref Range    Sodium 142 133 - 144 mmol/L    Potassium 3.7 3.4 - 5.3 mmol/L    Chloride 108 94 - 109 mmol/L    Carbon Dioxide 27 20 - 32 mmol/L    Anion Gap 7 3 - 14 mmol/L    Glucose 98 70 - 99 mg/dL    Urea Nitrogen 11 7 - 30 mg/dL    Creatinine 0.75 0.52 - 1.04 mg/dL    GFR Estimate 77 >60 mL/min/1.7m2    GFR Estimate If Black >90   GFR Calc   >60 mL/min/1.7m2    Calcium 8.2 (L) 8.5 - 10.1 mg/dL   Magnesium   Result Value Ref Range    Magnesium 2.2 1.6 - 2.3 mg/dL   Hemoglobin   Result Value Ref Range    Hemoglobin 9.2 (L) 11.7 - 15.7 g/dL   WBC count   Result Value Ref Range    WBC 6.4 4.0 - 11.0 10e9/L                Assessment and Plan:   1)  S/P Stealth Assisted Extension Of Fusion Thoracic 1-Thoracic 2, Bautista Sanchez Osteotomy Thoracic 1-2, Interbody Fusion Thoracic 1-2.  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  Adequate pain control.    2)  S/P T2-pelvis PLF 8/15. Repaired dural tear.  Indication scoliosis, flatback syndrome, lumbar stenosis with neurogenic claudication. Course complicated by opiate related delirium.   3)  Acute blood loss anemia, adequate.  4)  HTN, adequate off lisinopril.  Bolus for hypotension 2/7.  5)  Constipation.    7)  LETA, mild.  Noted last admit - potentially related to anesthesia, lisinopril, mild hypotension.  Resolved.    8)  Hypoxemia c/w opiate related sedation with hypoventilation,  atelectasis in conjunction with retained upper airway secretions.  Improved.   9)  Urine retention, aggravated by opiates, constipation.    10)  Fever - with upper airway congestion, potential risk for aspiration check CXR to ensure no pneumonia.  F/U UC.  11)  Tachycardia - potentially multifactorial.  Just back to bed.  Nebs may be an issue.  Low grade fever.  Possible volume depletion.  HR last admit upper 90's.     PLAN:  1)  IV fluid bolus with f/u HR response.  Adjust IV fluids.  2)  Same pain regimen.   3)  IS, increased bowel meds 2/8. Supp today.  F/U PVR, UC.  4)  Scheduled and prn nebs. IS, CXR, flutter valve.  5)  Trend labs, monitor clinically.  If tachycardia persists consider V/Q (defer CTA with recent Cr bump).   ADD:  CXR increase markings left lung base.  Possible atelectasis.  Cannot exclude infiltrate.  Start empiric levaquin.         Attestation:  I have reviewed today's vital signs, notes, medications, labs and imaging.     Edd Abreu MD

## 2017-02-10 VITALS
SYSTOLIC BLOOD PRESSURE: 137 MMHG | DIASTOLIC BLOOD PRESSURE: 61 MMHG | WEIGHT: 260 LBS | RESPIRATION RATE: 16 BRPM | HEIGHT: 65 IN | OXYGEN SATURATION: 99 % | HEART RATE: 111 BPM | TEMPERATURE: 97.2 F | BODY MASS INDEX: 43.32 KG/M2

## 2017-02-10 LAB
ANION GAP SERPL CALCULATED.3IONS-SCNC: 6 MMOL/L (ref 3–14)
BUN SERPL-MCNC: 6 MG/DL (ref 7–30)
CALCIUM SERPL-MCNC: 8.2 MG/DL (ref 8.5–10.1)
CHLORIDE SERPL-SCNC: 111 MMOL/L (ref 94–109)
CO2 SERPL-SCNC: 27 MMOL/L (ref 20–32)
CREAT SERPL-MCNC: 0.65 MG/DL (ref 0.52–1.04)
ERYTHROCYTE [DISTWIDTH] IN BLOOD BY AUTOMATED COUNT: 14.8 % (ref 10–15)
GFR SERPL CREATININE-BSD FRML MDRD: ABNORMAL ML/MIN/1.7M2
GLUCOSE SERPL-MCNC: 92 MG/DL (ref 70–99)
HCT VFR BLD AUTO: 28.9 % (ref 35–47)
HGB BLD-MCNC: 8.8 G/DL (ref 11.7–15.7)
MCH RBC QN AUTO: 28.7 PG (ref 26.5–33)
MCHC RBC AUTO-ENTMCNC: 30.4 G/DL (ref 31.5–36.5)
MCV RBC AUTO: 94 FL (ref 78–100)
PLATELET # BLD AUTO: 190 10E9/L (ref 150–450)
POTASSIUM SERPL-SCNC: 3.8 MMOL/L (ref 3.4–5.3)
RBC # BLD AUTO: 3.07 10E12/L (ref 3.8–5.2)
SODIUM SERPL-SCNC: 144 MMOL/L (ref 133–144)
WBC # BLD AUTO: 5.8 10E9/L (ref 4–11)

## 2017-02-10 PROCEDURE — A9270 NON-COVERED ITEM OR SERVICE: HCPCS | Mod: GY | Performed by: ORTHOPAEDIC SURGERY

## 2017-02-10 PROCEDURE — 25000132 ZZH RX MED GY IP 250 OP 250 PS 637: Mod: GY | Performed by: ORTHOPAEDIC SURGERY

## 2017-02-10 PROCEDURE — 85027 COMPLETE CBC AUTOMATED: CPT | Performed by: INTERNAL MEDICINE

## 2017-02-10 PROCEDURE — 36415 COLL VENOUS BLD VENIPUNCTURE: CPT | Performed by: INTERNAL MEDICINE

## 2017-02-10 PROCEDURE — 25000132 ZZH RX MED GY IP 250 OP 250 PS 637: Mod: GY | Performed by: PHYSICIAN ASSISTANT

## 2017-02-10 PROCEDURE — 99232 SBSQ HOSP IP/OBS MODERATE 35: CPT | Performed by: INTERNAL MEDICINE

## 2017-02-10 PROCEDURE — 40000275 ZZH STATISTIC RCP TIME EA 10 MIN

## 2017-02-10 PROCEDURE — 94640 AIRWAY INHALATION TREATMENT: CPT

## 2017-02-10 PROCEDURE — 80048 BASIC METABOLIC PNL TOTAL CA: CPT | Performed by: INTERNAL MEDICINE

## 2017-02-10 PROCEDURE — A9270 NON-COVERED ITEM OR SERVICE: HCPCS | Mod: GY | Performed by: PHYSICIAN ASSISTANT

## 2017-02-10 PROCEDURE — A9270 NON-COVERED ITEM OR SERVICE: HCPCS | Mod: GY | Performed by: INTERNAL MEDICINE

## 2017-02-10 PROCEDURE — 25000132 ZZH RX MED GY IP 250 OP 250 PS 637: Mod: GY | Performed by: INTERNAL MEDICINE

## 2017-02-10 PROCEDURE — 25000308 HC RX OP HPI UCR WEL MED 250 IP 250: Performed by: INTERNAL MEDICINE

## 2017-02-10 RX ORDER — ALBUTEROL SULFATE 0.83 MG/ML
2.5 SOLUTION RESPIRATORY (INHALATION) 4 TIMES DAILY
Qty: 1 BOX | Refills: 0 | Status: SHIPPED | OUTPATIENT
Start: 2017-02-10 | End: 2017-03-23

## 2017-02-10 RX ORDER — ALBUTEROL SULFATE 5 MG/ML
2.5 SOLUTION RESPIRATORY (INHALATION) EVERY 4 HOURS PRN
Qty: 60 VIAL | Refills: 0 | Status: SHIPPED | OUTPATIENT
Start: 2017-02-10 | End: 2017-03-23

## 2017-02-10 RX ORDER — LEVOFLOXACIN 750 MG/1
750 TABLET, FILM COATED ORAL EVERY 24 HOURS
Qty: 6 TABLET | Refills: 0 | Status: SHIPPED | OUTPATIENT
Start: 2017-02-10 | End: 2017-03-23

## 2017-02-10 RX ORDER — ALBUTEROL SULFATE 0.83 MG/ML
1 SOLUTION RESPIRATORY (INHALATION) EVERY 4 HOURS PRN
Qty: 25 VIAL | Refills: 0 | COMMUNITY
Start: 2017-02-10 | End: 2017-02-20

## 2017-02-10 RX ORDER — NORTRIPTYLINE HCL 10 MG
10 CAPSULE ORAL AT BEDTIME
Qty: 30 CAPSULE | Refills: 0 | Status: SHIPPED | OUTPATIENT
Start: 2017-02-10 | End: 2017-07-27

## 2017-02-10 RX ORDER — GUAIFENESIN 600 MG/1
600 TABLET, EXTENDED RELEASE ORAL 2 TIMES DAILY
Qty: 20 TABLET | Refills: 0 | Status: SHIPPED | OUTPATIENT
Start: 2017-02-10 | End: 2017-04-20

## 2017-02-10 RX ADMIN — GUAIFENESIN 600 MG: 600 TABLET, EXTENDED RELEASE ORAL at 08:23

## 2017-02-10 RX ADMIN — OXYCODONE HYDROCHLORIDE 10 MG: 5 TABLET ORAL at 04:04

## 2017-02-10 RX ADMIN — DIAZEPAM 5 MG: 5 TABLET ORAL at 01:28

## 2017-02-10 RX ADMIN — ALBUTEROL SULFATE 2.5 MG: 2.5 SOLUTION RESPIRATORY (INHALATION) at 07:44

## 2017-02-10 RX ADMIN — OXYCODONE HYDROCHLORIDE 10 MG: 5 TABLET ORAL at 09:25

## 2017-02-10 RX ADMIN — OXYCODONE HYDROCHLORIDE 10 MG: 5 TABLET ORAL at 00:25

## 2017-02-10 RX ADMIN — ACETAMINOPHEN 650 MG: 325 TABLET ORAL at 01:28

## 2017-02-10 RX ADMIN — Medication 2 LOZENGE: at 01:31

## 2017-02-10 NOTE — PROGRESS NOTES
"[facility] Internal Medicine Progress Note            Interval History:   Record reviewed.  Seen with RN.  No further fever spike.  Levaquin 750 mg po daily started last PM (tolerated previously) with atelectasis vs infiltrate left base.  In general doing well.  Off O2 2 days.  Ambulatory in asher without LH.  Decrease upper airway congestion.  No CP, mild dyspnea with cough, no secretion production, no appreciable YU.  Tolerating diet.  No nausea, reflux, abd pain.  Formed BM this AM.  Voiding OK.  Anxious for DC home today.             Medications:   All medications reviewed today          Physical Exam:   Blood pressure 137/61, pulse 111, temperature 97.2  F (36.2  C), temperature source Oral, resp. rate 16, height 1.651 m (5' 5\"), weight 117.935 kg (260 lb), SpO2 99 %.    Intake/Output Summary (Last 24 hours) at 02/08/17 1410  Last data filed at 02/08/17 1015   Gross per 24 hour   Intake   1400 ml   Output    620 ml   Net    780 ml          General:  Alert.  Appropriate.  Off 02.  No distress.     Heent:      Neck:    Skin:    Chest:  Clear presently without rales, rhonchi, bronchospasm.    Cardiac:  Reg without gallop, murmur.  No JVD.     Abdomen:  Non distended, soft, non tender.  BS normal.     Extremities:  Perfused.  No edema, calf, thigh tenderness.     Neuro:            Data:     Results for orders placed or performed during the hospital encounter of 02/06/17 (from the past 24 hour(s))   Basic metabolic panel   Result Value Ref Range    Sodium 144 133 - 144 mmol/L    Potassium 3.8 3.4 - 5.3 mmol/L    Chloride 111 (H) 94 - 109 mmol/L    Carbon Dioxide 27 20 - 32 mmol/L    Anion Gap 6 3 - 14 mmol/L    Glucose 92 70 - 99 mg/dL    Urea Nitrogen 6 (L) 7 - 30 mg/dL    Creatinine 0.65 0.52 - 1.04 mg/dL    GFR Estimate >90  Non  GFR Calc   >60 mL/min/1.7m2    GFR Estimate If Black >90   GFR Calc   >60 mL/min/1.7m2    Calcium 8.2 (L) 8.5 - 10.1 mg/dL   CBC with platelets "   Result Value Ref Range    WBC 5.8 4.0 - 11.0 10e9/L    RBC Count 3.07 (L) 3.8 - 5.2 10e12/L    Hemoglobin 8.8 (L) 11.7 - 15.7 g/dL    Hematocrit 28.9 (L) 35.0 - 47.0 %    MCV 94 78 - 100 fl    MCH 28.7 26.5 - 33.0 pg    MCHC 30.4 (L) 31.5 - 36.5 g/dL    RDW 14.8 10.0 - 15.0 %    Platelet Count 190 150 - 450 10e9/L                Assessment and Plan:   1)  S/P Stealth Assisted Extension Of Fusion Thoracic 1-Thoracic 2, Bautista Sanchez Osteotomy Thoracic 1-2, Interbody Fusion Thoracic 1-2.  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  Adequate pain control.    2)  S/P T2-pelvis PLF 8/15. Repaired dural tear.  Indication scoliosis, flatback syndrome, lumbar stenosis with neurogenic claudication. Course complicated by opiate related delirium.   3)  Acute blood loss anemia, adequate.  4)  HTN, adequate off lisinopril.  Bolus for hypotension 2/7.  5)  Constipation.  Resolved.   7)  LETA, mild.  Noted last admit - potentially related to anesthesia, lisinopril, mild hypotension.  Resolved.    8)  Hypoxemia c/w opiate related sedation with hypoventilation, atelectasis in conjunction with retained upper airway secretions. Resolved.   9)  Urine retention, aggravated by opiates, constipation.  Resolved.   10)  Fever - resolved.  Possible post surgical. Potential infiltrate left base.    11)  Tachycardia - potentially multifactorial.  Just back to bed.  Nebs may be an issue.  Low grade fever.  Possible volume depletion.  HR last recorded 80's.      PLAN:  1) Discussed issue of DC - anxious to go home.  Has PMD access.  No overt cardiopulmonary compromise.  Oxygenating well.  Tolerating opiates.  Appears clinically stable for DC.  2)  Meds/orders reviewed.  Additional 6 days levaquin.  Continue IS, flutter valve, scheduled and prn nebs.  Wean opiates as able.  3)  HHC with RN 2/11 (see orders).  To hold lisinopril/HCTZ.  Contact PMD to arrange follow up.          Attestation:  I have reviewed  today's vital signs, notes, medications, labs and imaging.     Edd Abreu MD

## 2017-02-10 NOTE — PROGRESS NOTES
Orthopaedic Surgery Progress Note    E: Afebrile, hemodynamically normal. Breathing is much improved this AM.     S: Pain well controlled this AM on orals. Denies numbness or tingling to bilateral hands/upper extremities or legs. Denies chest pain, shortness of breath, nausea, or vomiting. Voiding spont. Passing flatus. No BM. No abd pain. No nausea. Tolerating diet. Working with PT. [facility] in place.     O:  Filed Vitals:    02/09/17 1745 02/09/17 1921 02/10/17 0110 02/10/17 0500   BP:       Pulse:       Temp: 99.5  F (37.5  C)  99.3  F (37.4  C) 97.6  F (36.4  C)   TempSrc: Oral  Oral Oral   Resp:       Height:       Weight:       SpO2:  98%           Exam:  Gen: No acute distress, resting comfortably in bed. Conversant. [place] InterStelNet in place.   Resp: Non-labored breathing on room air.  Back: Aquacel dressing in place, Drain site dressing is c/d/i.   BUE:  - 5/5 EPL  - 4+/5 IO  - 5/5   - 5/5 Deltoids  - SILT in med/rad/uln/ax.  - Hands warm and well perfused.  BLE:  - 5/5 EHL/FHL/TA/GSC/Quads/Abductors.   - SILT in L3-S1, symmetric bilaterally.  - Feet wwp.     Lab Results   Component Value Date    WBC 5.8 02/10/2017    WBC 6.4 02/09/2017    HGB 8.8* 02/10/2017    HGB 9.2* 02/09/2017     02/10/2017     02/08/2017       Assessment/Plan: Julisa Champion is a 66 year old female s/p stealth assisted T1-T2 transforaminal interbody fusion with bilateral synthetic interbody cages, SPO T1-T2, removal/replacement of T2-T3 instrumentation, and T1-T2 posterolateral arthrodesis with local harvest autograft and allograft with Dr. Wolfe and Dr. Morocho. Doing well post-op.    Activity: Up with assist. No bending, twisting, or lifting > 10 pounds. Cervical collar ([place] J) at all times except for hygiene. No head of bed restrictions.  Weight bearing status: WBAT BUE and BLE.  Antibiotics: Ancef x 24 hours, completed.  Diet: Regular diet.    DVT prophylaxis: Ambulatory. SCDs.  Bracing/Splinting: Transition to [place]  J on POD 1 with Orthotics assistance.  Wound Care: Aquacel dressing x 7 days, reinforce PRN.  Drains: Drain removed on POD 2.   Pain management: Orals PRN  X-rays: Standing AP/Lat full spine x-rays prior to discharge. Completed, radiographs reviewed with Dr. Wolfe.   Physical Therapy: Mobilization, ROM, ADL's.   Occupational Therapy: ADL's  Labs: Stable Acute blood loss anemia at 8.8.   Consults: PT, OT. Hospitalist, Orthotics, appreciate assistance in caring for this patient.   Follow-up: Clinic with Dr. Wolfe as follows:   Future Appointments  Date Time Provider Department Center   2/7/2017 11:30 AM Thu Galindo, CHAPITO [facility] [place]   2/7/2017 3:00 PM Thu Galindo PT [facility] [place]   2/8/2017 8:00 AM Tete Wheeler OT [facility] [place]   3/23/2017 11:45 AM Isauro Wolfe MD [facility]   4/20/2017 10:30 AM Isauro Wolfe MD [facility]       Disposition: Pending progress with therapies, pain control on orals, and medical stability, anticipate discharge to Home Today.    Logan Martini MD  Orthopaedic Surgery Resident, PGY-4  Pager: [phone]

## 2017-02-10 NOTE — PLAN OF CARE
Problem: Goal Outcome Summary  Goal: Goal Outcome Summary  Outcome: Improving  Pt is A&O. VSS and WNL aside from ongoing tachycardia. Cervical collar in place and skin underneath has no redness. Aquacel and old BREE site drsg are CDI. CMS intact. Denies numbness or tingling. Took PRN tylenol and valium x 1 for back pain/spasms which was effective. PIV infusing NS @ 75/hr. Tolerating diet with no n/v. BS present and active x 4. Voiding adequately in bathroom. Reports having 2 large BMs today and refused evening senna but took miralax. Continues to have ongoing coarse, productive cough. Reports some relief from nebs and lungs are CTA. CXR results came back and MD ordered oral abx. Denies chest pain or SOB. Spouse present in room and assisting with cares. Has call light in reach and is able to make needs known. Continue with plan of care.

## 2017-02-10 NOTE — PLAN OF CARE
Problem: Goal Outcome Summary  Goal: Goal Outcome Summary  A&O. VSS, temp 99.3. R PIV infusing 75ml/hr. Upper back and shoulder pain managed with PRN oxycodone q3, Tylenol q4, and Valium q6 overnight.   CMS intact, with exception of baseline upper back numbness from previous surgeries. Ruby-J collar removed for skin inspection, aquacel dressing CDI.  Red blanchable spot on R upper back from bow on gown, pt requested that staff put gauze over it to prevent further irritation. Voiding well, BM x1 overnight.  Continue POC.

## 2017-02-10 NOTE — PLAN OF CARE
Problem: Goal Outcome Summary  Goal: Goal Outcome Summary  Outcome: Adequate for Discharge Date Met:  02/10/17  VSS. LS diminished, coarse. Receiving nebs, will continue at home. Also using flutter. Neuro's intact. Mercer-J collar in place. Pain managed with oxycodone. Pt has been discharge. Discharge instructions and medications have been given and explained. Pt states she understands and has no questions.  also present for dc instructions. Discharged home, HHC to follow.

## 2017-02-14 DIAGNOSIS — Z98.1 S/P SPINAL FUSION: ICD-10-CM

## 2017-02-14 RX ORDER — DIAZEPAM 5 MG
5 TABLET ORAL EVERY 6 HOURS PRN
Qty: 20 TABLET | Refills: 0 | Status: SHIPPED | OUTPATIENT
Start: 2017-02-14 | End: 2017-02-20

## 2017-02-14 NOTE — TELEPHONE ENCOUNTER
Pt continues to have muscle spasms during her postop period, asking for valium refill, was given #20 upon discharge and pt states she is worried she will run out.  She underwent T1-T2 fusion.  She continues to take 2 oxycodone every 4 hours which helps for a while but she states she is not ready to take less.  She also had an episode of emesis yesterday which caused her mid back left side to hurt more.  Rx renewed for valium and phoned to pt's pharm.

## 2017-02-20 DIAGNOSIS — Z98.1 S/P SPINAL FUSION: ICD-10-CM

## 2017-02-20 RX ORDER — DIAZEPAM 5 MG
5 TABLET ORAL EVERY 6 HOURS PRN
Qty: 20 TABLET | Refills: 0 | Status: SHIPPED | OUTPATIENT
Start: 2017-02-20 | End: 2017-07-27

## 2017-02-20 RX ORDER — OXYCODONE HYDROCHLORIDE 5 MG/1
5-10 TABLET ORAL EVERY 4 HOURS PRN
Qty: 100 TABLET | Refills: 0 | Status: SHIPPED | OUTPATIENT
Start: 2017-02-20 | End: 2017-07-27

## 2017-02-20 NOTE — TELEPHONE ENCOUNTER
"Julisa calling for med refills.  States she is anxious, \"My daughter is leaving for [place] today\".  Reports she takes 2 oxycodone tablets every 4 hours.  Advised Julisa take 2 tablets in the morning, 1 tablet 6 hours later and 1 tablet every 4 hours PRN.  Take 2 tablets at bedtime and try to wean down to 1 tablet every 6 hours as tolerated, she is in agreement with plan.  RX printed, signed and mailed to patients home as requested.  Fatmata Tolliver RN 10:24 AM 2/20/2017        "

## 2017-03-06 DIAGNOSIS — Z98.1 S/P SPINAL FUSION: Primary | ICD-10-CM

## 2017-03-23 ENCOUNTER — OFFICE VISIT (OUTPATIENT)
Dept: ORTHOPEDICS | Facility: CLINIC | Age: 67
End: 2017-03-23

## 2017-03-23 VITALS — WEIGHT: 237.4 LBS | HEIGHT: 65 IN | BODY MASS INDEX: 39.55 KG/M2

## 2017-03-23 DIAGNOSIS — Z98.1 S/P SPINAL FUSION: Primary | ICD-10-CM

## 2017-03-23 DIAGNOSIS — M99.12: ICD-10-CM

## 2017-03-23 DIAGNOSIS — M41.20 OTHER IDIOPATHIC SCOLIOSIS: ICD-10-CM

## 2017-03-23 ASSESSMENT — ENCOUNTER SYMPTOMS
BLOOD IN STOOL: 0
SKIN CHANGES: 0
NUMBNESS: 1
NECK PAIN: 1
MUSCLE CRAMPS: 1
BLOATING: 0
TINGLING: 1
DIARRHEA: 0
WEAKNESS: 1
TREMORS: 0
JOINT SWELLING: 0
MYALGIAS: 1
DIZZINESS: 0
JAUNDICE: 0
RECTAL BLEEDING: 0
MEMORY LOSS: 0
CONSTIPATION: 1
RECTAL PAIN: 0
PARALYSIS: 0
SEIZURES: 0
BOWEL INCONTINENCE: 0
NAIL CHANGES: 0
VOMITING: 0
STIFFNESS: 0
POOR WOUND HEALING: 0
HEARTBURN: 0
NAUSEA: 0
LOSS OF CONSCIOUSNESS: 0
SPEECH CHANGE: 0
MUSCLE WEAKNESS: 1
HEADACHES: 0
BACK PAIN: 1
ABDOMINAL PAIN: 0
ARTHRALGIAS: 0

## 2017-03-23 NOTE — MR AVS SNAPSHOT
After Visit Summary   3/23/2017    Julisa Champion    MRN: [id number]           Patient Information     Date Of Birth          1950        Visit Information        Provider Department      3/23/2017 11:45 AM Isauro Wolfe MD [facility]        Today's Diagnoses     S/P spinal fusion    -  1    Subluxation complex (vertebral) of thoracic region        Other idiopathic scoliosis           Follow-ups after your visit        Your next 10 appointments already scheduled     Apr 20, 2017 10:30 AM CDT   (Arrive by 10:00 AM)   RETURN SPINE with Isauro Wolfe MD   [facility] ([email or web address])    [address]  [address] [facility] [address] [phone]              Who to contact     Please call your clinic at [phone] to:    Ask questions about your health    Make or cancel appointments    Discuss your medicines    Learn about your test results    Speak to your doctor   If you have compliments or concerns about an experience at your clinic, or if you wish to file a complaint, please contact [facility] Patient Relations at [phone] or email us at [email or web address]         Additional Information About Your Visit        MyChart Information     Ion Coret gives you secure access to your electronic health record. If you see a primary care provider, you can also send messages to your care team and make appointments. If you have questions, please call your primary care clinic.  If you do not have a primary care provider, please call [phone] and they will assist you.      [employer] is an electronic gateway that provides easy, online access to your medical records. With [employer], you can request a clinic appointment, read your test results, renew a prescription or communicate with your care team.     To access your existing account, please contact your [facility]  "Clinic or call [phone] for assistance.        Care EveryWhere ID     This is your Care EveryWhere ID. This could be used by other organizations to access your [facility] medical records  [id number]        Your Vitals Were     Height BMI (Body Mass Index)                1.66 m (5' 5.35\") 39.08 kg/m2           Blood Pressure from Last 3 Encounters:   02/10/17 137/61   01/13/17 122/74   08/27/16 142/69    Weight from Last 3 Encounters:   03/23/17 107.7 kg (237 lb 6.4 oz)   02/09/17 117.9 kg (260 lb)   01/13/17 108.2 kg (238 lb 9.6 oz)              Today, you had the following     No orders found for display         Today's Medication Changes          These changes are accurate as of: 3/23/17 11:59 PM.  If you have any questions, ask your nurse or doctor.               Stop taking these medicines if you haven't already. Please contact your care team if you have questions.     albuterol (2.5 MG/3ML) 0.083% neb solution   Stopped by:  Isauro Wolfe MD           albuterol (5 MG/ML) 0.5% neb solution   Commonly known as:  PROVENTIL   Stopped by:  Isauro Wolfe MD           levofloxacin 750 MG tablet   Commonly known as:  LEVAQUIN   Stopped by:  Isauro Wolfe MD                    Primary Care Provider    [facility] Other Clinic                Thank you!     Thank you for choosing [facility]  for your care. Our goal is always to provide you with excellent care. Hearing back from our patients is one way we can continue to improve our services. Please take a few minutes to complete the written survey that you may receive in the mail after your visit with us. Thank you!             Your Updated Medication List - Protect others around you: Learn how to safely use, store and throw away your medicines at www.disposemymeds.org.          This list is accurate as of: 3/23/17 11:59 PM.  Always use your most recent med list.                   Brand Name Dispense Instructions for use    acetaminophen 500 " MG tablet    TYLENOL     Take 2 tablets (1,000 mg) by mouth every 6 hours as needed for mild pain or fever       diazepam 5 MG tablet    VALIUM    20 tablet    Take 1 tablet (5 mg) by mouth every 6 hours as needed for anxiety or other (muscle spasms)       EFFEXOR PO      Take 225 mg by mouth every evening       gabapentin 100 MG capsule    NEURONTIN    90 capsule    Take 1 capsule (100 mg) by mouth 3 times daily       guaiFENesin 600 MG 12 hr tablet    MUCINEX    20 tablet    Take 1 tablet (600 mg) by mouth 2 times daily       nortriptyline 10 MG capsule    PAMELOR    30 capsule    Take 1 capsule (10 mg) by mouth At Bedtime       order for DME     1 each    Equipment being ordered: Walker Wheels () and Walker () Treatment Diagnosis: Gait instability       oxyCODONE 5 MG IR tablet    ROXICODONE    100 tablet    Take 1-2 tablets (5-10 mg) by mouth every 4 hours as needed for moderate to severe pain       polyethylene glycol Packet    MIRALAX/GLYCOLAX    30 packet    Take 17 g by mouth daily as needed for constipation       senna-docusate 8.6-50 MG per tablet    SENOKOT-S;PERICOLACE    60 tablet    Take 1-2 tablets by mouth daily as needed for constipation

## 2017-03-23 NOTE — NURSING NOTE
"Reason For Visit:   Chief Complaint   Patient presents with     Surgical Followup     s/p spinal fusion 2/6/17       Primary MD: Dr. Jarrett Sanchez  Ref. MD: Dr. Laura Agee      Occupation retired RN.  Date of injury: none    Date of surgery: 2/6/17  Type of surgery: PROCEDURES: Extension of fusion to T1, pedicle screw instrumentation T1, decompression bilaterally T1-T2, Bautista Sanchez osteotomy T1-T2, placement intervertebral device T1-T2, image-guided surgery, posterior spinal fusion T1-T2. .  Smoker: No      Ht 1.66 m (5' 5.35\")  Wt 107.7 kg (237 lb 6.4 oz)  BMI 39.08 kg/m2    Pain Assessment  Patient Currently in Pain: Yes  0-10 Pain Scale: 5  Primary Pain Location: Back  Pain Orientation: Upper  Pain Descriptors: Dull, Aching  Alleviating Factors: Rest, Heat, Ice  Aggravating Factors: Other (comment) (being up and about, using arms)    Oswestry (SILVA) Questionnaire    OSWESTRY DISABILITY INDEX 3/23/2017   SECTION 1-PAIN INTENSITY 2  The pain is moderate at the moment.   SECTION 2-PERSONAL CARE (WASHING,DRESSING,ETC.) 3  I need some help but manage most of my personal care.   SECTION 3-LIFTING 4  I can only lift very light weights.   SECTION 4-WALKING 2  Pain prevents me from walking more than a quarter of a mile.   SECTION 5-SITTING 1  I can sit in my favorite chair as long as I like   SECTION 6-STANDING 1  I can stand as long as I want but it gives me additional pain.   SECTION 7-SLEEPING 2  Because of pain I have less than 6 hours sleep.   SECTION 8-SEX LIFE (IF APPLICABLE) Does not apply / No answer   SECTION 9-SOCIAL LIFE 1  My social life is normal but increases the degree of pain.   SECTION 10-TRAVELING 1  I can travel anywhere but it gives me additional pain.   Oswestry Disability Index: Count 9   SILVA: Total Score = SUM (total for answered questions) 17   Computed Oswestry Score 37.77 (%)                      Numeric Rating Scale:  VAS Scores     VAS Survey 3/23/2017   What is your level of back " pain during the last week: 6.0   What is your level of RIGHT leg pain during the last week: 0   What is your level of LEFT leg pain during the last week: 0   What is your level of neck pain during the last week: 7.0   What is your level of RIGHT arm pain during the last week: 6.0   What is your level of LEFT arm pain during the last week: 0                Promis 10 Assessment    PROMIS 10 3/23/2017   In general, would you say your health is: Good = 3   In general, would you say your quality of life is: Good = 3   In general, how would you rate your physical health? Good = 3   In general, how would you rate your mental health, including your mood and your ability to think? Good = 3   In general, how would you rate your satisfaction with your social activities and relationships? Good = 3   In general, please rate how well you carry out your usual social activities and roles Good = 3   To what extent are you able to carry out your everyday physical activities such as walking, climbing stairs, carrying groceries, or moving a chair? A Little = 2   How often have you been bothered by emotional problems such as feeling anxious, depressed or irritable? Sometimes = 3   How would you rate your fatigue on average? Moderate = 3   How would you rate your pain on average?   0 = No Pain  to  10 = Worst Imaginable Pain 5   Global Physical Health Score : Raw Score 11 (SUM : G03 - G06 - G07 - G08)   Global Mentall Health Score : Raw Score 12 (SUM : G02 - G04 - G05 - G10)   Total (Physical + Mental Health Score) 23

## 2017-03-23 NOTE — PROGRESS NOTES
HISTORY OF PRESENT ILLNESS:  Julisa has a long history of adult scoliosis with severe neurogenic claudication.  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  We have had her in a cervical collar since that second surgery.  She is feeling better than she did before the second surgery, but she is really not liking the collar very well.  Visual analog pain scale currently is a 5.  Oswestry Disability Index score is 37%.  Visual analog pain scale for back is 6, neck is 7 and right arm is 6.  PROMIS 10 questionnaire score overall global physical health is 11 and global mental health is 12 for a summary score of 23.      PHYSICAL EXAMINATION:  Reveals a well-developed, well-nourished female in no acute distress.  She does have a walker with her today.  She uses this when she goes outside the house but is walking without it around the house.  On stance, she has good coronal alignment.  Sagittal alignment shows a slight forward chin thrust position, but not horrendous.   strength shows that she has decreased a little bit in the right side.  Interosseous strength is normal and she does have some numbness in an ulnar nerve type distribution.      RADIOGRAPHIC EXAMINATION:  Today includes AP and lateral full spine.  This shows the instrumentation in place with no evidence of C7-T1 problems at the current time.      ASSESSMENT/PLAN:  I would like to let her progressively decrease her cervical collar wear.  She can come out of it at night and then increase the timeout during the day by an hour a day.  We will see her back in 6 weeks, at which point in time we will get repeat EOS imaging.       Answers for HPI/ROS submitted by the patient on 3/23/2017   General Symptoms: No  Skin Symptoms: Yes  HENT Symptoms: No  EYE SYMPTOMS: No  HEART SYMPTOMS: No  LUNG SYMPTOMS: No  INTESTINAL SYMPTOMS: Yes  URINARY SYMPTOMS:  No  GYNECOLOGIC SYMPTOMS: No  BREAST SYMPTOMS: No  SKELETAL SYMPTOMS: Yes  BLOOD SYMPTOMS: No  NERVOUS SYSTEM SYMPTOMS: Yes  MENTAL HEALTH SYMPTOMS: No  Changes in hair: No  Changes in moles/birth marks: No  Itching: Yes  Rashes: No  Changes in nails: No  Acne: No  Hair in places you don't want it: No  Change in facial hair: No  Warts: No  Non-healing sores: No  Scarring: No  Flaking of skin: No  Color changes of hands/feet in cold : No  Sun sensitivity: No  Skin thickening: No  Heart burn or indigestion: No  Nausea: No  Vomiting: No  Abdominal pain: No  Bloating: No  Constipation: Yes  Diarrhea: No  Blood in stool: No  Black stools: No  Rectal or Anal pain: No  Fecal incontinence: No  Rectal bleeding: No  Yellowing of skin or eyes: No  Vomit with blood: No  Change in stools: No  Hemorrhoids: No  Back pain: Yes  Muscle aches: Yes  Neck pain: Yes  Swollen joints: No  Joint pain: No  Bone pain: No  Muscle cramps: Yes  Muscle weakness: Yes  Joint stiffness: No  Bone fracture: No  Dizziness or trouble with balance: No  Fainting or black-out spells: No  Memory loss: No  Headache: No  Seizures: No  Speech problems: No  Tingling: Yes  Tremor: No  Weakness: Yes  Difficulty walking: No  Paralysis: No  Numbness: Yes

## 2017-03-23 NOTE — LETTER
3/23/2017       RE: Julisa Champion  [address] [id number]     Dear Colleague,    Thank you for referring your patient, Julisa Champion, to the [facility] at [facility]. Please see a copy of my visit note below.    HISTORY OF PRESENT ILLNESS:  Julisa has a long history of adult scoliosis with severe neurogenic claudication.  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  We have had her in a cervical collar since that second surgery.  She is feeling better than she did before the second surgery, but she is really not liking the collar very well.  Visual analog pain scale currently is a 5.  Oswestry Disability Index score is 37%.  Visual analog pain scale for back is 6, neck is 7 and right arm is 6.  PROMIS 10 questionnaire score overall global physical health is 11 and global mental health is 12 for a summary score of 23.      PHYSICAL EXAMINATION:  Reveals a well-developed, well-nourished female in no acute distress.  She does have a walker with her today.  She uses this when she goes outside the house but is walking without it around the house.  On stance, she has good coronal alignment.  Sagittal alignment shows a slight forward chin thrust position, but not horrendous.   strength shows that she has decreased a little bit in the right side.  Interosseous strength is normal and she does have some numbness in an ulnar nerve type distribution.      RADIOGRAPHIC EXAMINATION:  Today includes AP and lateral full spine.  This shows the instrumentation in place with no evidence of C7-T1 problems at the current time.      ASSESSMENT/PLAN:  I would like to let her progressively decrease her cervical collar wear.  She can come out of it at night and then increase the timeout during the day by an hour a day.  We will see her back in 6  weeks, at which point in time we will get repeat EOS imaging.       Answers for HPI/ROS submitted by the patient on 3/23/2017   General Symptoms: No  Skin Symptoms: Yes  HENT Symptoms: No  EYE SYMPTOMS: No  HEART SYMPTOMS: No  LUNG SYMPTOMS: No  INTESTINAL SYMPTOMS: Yes  URINARY SYMPTOMS: No  GYNECOLOGIC SYMPTOMS: No  BREAST SYMPTOMS: No  SKELETAL SYMPTOMS: Yes  BLOOD SYMPTOMS: No  NERVOUS SYSTEM SYMPTOMS: Yes  MENTAL HEALTH SYMPTOMS: No  Changes in hair: No  Changes in moles/birth marks: No  Itching: Yes  Rashes: No  Changes in nails: No  Acne: No  Hair in places you don't want it: No  Change in facial hair: No  Warts: No  Non-healing sores: No  Scarring: No  Flaking of skin: No  Color changes of hands/feet in cold : No  Sun sensitivity: No  Skin thickening: No  Heart burn or indigestion: No  Nausea: No  Vomiting: No  Abdominal pain: No  Bloating: No  Constipation: Yes  Diarrhea: No  Blood in stool: No  Black stools: No  Rectal or Anal pain: No  Fecal incontinence: No  Rectal bleeding: No  Yellowing of skin or eyes: No  Vomit with blood: No  Change in stools: No  Hemorrhoids: No  Back pain: Yes  Muscle aches: Yes  Neck pain: Yes  Swollen joints: No  Joint pain: No  Bone pain: No  Muscle cramps: Yes  Muscle weakness: Yes  Joint stiffness: No  Bone fracture: No  Dizziness or trouble with balance: No  Fainting or black-out spells: No  Memory loss: No  Headache: No  Seizures: No  Speech problems: No  Tingling: Yes  Tremor: No  Weakness: Yes  Difficulty walking: No  Paralysis: No  Numbness: Yes      Again, thank you for allowing me to participate in the care of your patient.      Sincerely,    Isauro Wolfe MD

## 2017-04-06 ASSESSMENT — ENCOUNTER SYMPTOMS
PARALYSIS: 0
BACK PAIN: 1
SPEECH CHANGE: 0
NUMBNESS: 0
TINGLING: 1
NECK PAIN: 1
DISTURBANCES IN COORDINATION: 1
MUSCLE CRAMPS: 1
HEADACHES: 0
TREMORS: 0
MEMORY LOSS: 0
ARTHRALGIAS: 0
WEAKNESS: 1
LOSS OF CONSCIOUSNESS: 0
STIFFNESS: 0
JOINT SWELLING: 0
DIZZINESS: 1
MUSCLE WEAKNESS: 1
SEIZURES: 0
MYALGIAS: 0

## 2017-04-10 DIAGNOSIS — Z98.1 S/P SPINAL FUSION: Primary | ICD-10-CM

## 2017-04-20 ENCOUNTER — OFFICE VISIT (OUTPATIENT)
Dept: ORTHOPEDICS | Facility: CLINIC | Age: 67
End: 2017-04-20

## 2017-04-20 VITALS — BODY MASS INDEX: 37.85 KG/M2 | HEIGHT: 66 IN | WEIGHT: 235.5 LBS

## 2017-04-20 DIAGNOSIS — Z98.1 S/P SPINAL FUSION: Primary | ICD-10-CM

## 2017-04-20 NOTE — PROGRESS NOTES
REASON FOR VISIT: S/P spinal fusion     HISTORY OF PRESENT ILLNESS: Julisa Champion is a 66 year old female who returned to clinic today for her second post operative appointment following extension of her posterior spinal fusion to T1.  She originally was fused from T2-pelvis but required an extension due to proximal junctional kyphosis.  She was last seen on 3/23/17 and was instructed to wean out of her cervical collar, which she has done.  She states that she is 75% better today, and she is feeling very good.  She complains of some numbness in the right forearm since her second surgery.  She is also wondering if there is a good pillow she can use at night.     Oswestry score: 40% (previously 37%)  [id number]: 24 (previously 23)  Pain scale: 0 (previously 5)    PHYSICAL EXAMINATION:   Constitutional: Healthy, alert and no distress    Head: Normocephalic. No masses, lesions, or abnormalities    Musculoskeletal: Extremities normal- no gross deformities noted and normal muscle tone.    LEFT RIGHT   Hip flexors 5/5 5/5   Quadriceps 5/5 5/5   Hamstrings 5/5 5/5   Gastroc/soleus 5/5 5/5   EHL 5/5 5/5   Interosseous 5/5 4/5    5/5 4/5   Wrist extension 5/5 5/5   Wrist flexion 5/5 5/5     Skin: No suspicious lesions or rashes. Incision appears to be healing well with no signs of infection or dehiscence.     Neurologic: Gait non-antalgic without assistive devices.     Psychiatric: Mentation appears normal and affect normal.    IMAGING: Updated AP and lateral EOS xrays were obtained today.  They show continued stable alignment.  Instrumentation is intact without evidence of loosening or migration. See full radiologic report in chart.    CLINICAL ASSESSMENT:   1. S/P extension of spinal fusion to T1 on 2/6/17  2. C8-T1 weakness on the right    DISCUSSION/PLAN:   Julisa is a 66 year old female who returned clinic today for her second post operative appointment following extension of her spinal fusion to T1, which was  performed on 2/6/17 by Dr. Wolfe.  She is progressing very well.  Updated radiographs were obtained today, and they show stable alignment with intact instrumentation.  On physical exam, she has some weakness in the right C8 and T1 nerve distributions.  We discussed a course of occupational therapy, and a referral was provided today.  She can now resume pool exercises.  She should return to clinic again in three months for further assessment and repeat radiographs.    All questions and concerns were answered to the patient's apparent satisfaction before leaving the clinic.     Thank you for allowing Dr. Wolfe and I to participate in the care of Julisa WOODARD Nellyalcides.    Respectfully,  Madison Julian PA-C    CC  Copy to patient    [address] [place] [address]  Answers for HPI/ROS submitted by the patient on 4/6/2017   General Symptoms: No  Skin Symptoms: No  HENT Symptoms: No  EYE SYMPTOMS: No  HEART SYMPTOMS: No  LUNG SYMPTOMS: No  INTESTINAL SYMPTOMS: No  URINARY SYMPTOMS: No  GYNECOLOGIC SYMPTOMS: No  BREAST SYMPTOMS: No  SKELETAL SYMPTOMS: Yes  BLOOD SYMPTOMS: No  NERVOUS SYSTEM SYMPTOMS: Yes  MENTAL HEALTH SYMPTOMS: No  Back pain: Yes  Muscle aches: No  Neck pain: Yes  Swollen joints: No  Joint pain: No  Bone pain: No  Muscle cramps: Yes  Muscle weakness: Yes  Joint stiffness: No  Bone fracture: No  Trouble with coordination: Yes  Dizziness or trouble with balance: Yes  Fainting or black-out spells: No  Memory loss: No  Headache: No  Seizures: No  Speech problems: No  Tingling: Yes  Tremor: No  Weakness: Yes  Difficulty walking: Yes  Paralysis: No  Numbness: No

## 2017-04-20 NOTE — LETTER
4/20/2017       RE: Julisa Champion  [address] [place] [address]     Dear Colleague,    Thank you for referring your patient, Julisa Champion, to the [facility]THOP[facility]ED[facility] at [facility]. Please see a copy of my visit note below.    REASON FOR VISIT: S/P spinal fusion     HISTORY OF PRESENT ILLNESS: Julisa Champion is a 66 year old female who returned to clinic today for her second post operative appointment following extension of her posterior spinal fusion to T1.  She originally was fused from T2-pelvis but required an extension due to proximal junctional kyphosis.  She was last seen on 3/23/17 and was instructed to wean out of her cervical collar, which she has done.  She states that she is 75% better today, and she is feeling very good.  She complains of some numbness in the right forearm since her second surgery.  She is also wondering if there is a good pillow she can use at night.     Oswestry score: 40% (previously 37%)  [id number]: 24 (previously 23)  Pain scale: 0 (previously 5)    PHYSICAL EXAMINATION:   Constitutional: Healthy, alert and no distress    Head: Normocephalic. No masses, lesions, or abnormalities    Musculoskeletal: Extremities normal- no gross deformities noted and normal muscle tone.    LEFT RIGHT   Hip flexors 5/5 5/5   Quadriceps 5/5 5/5   Hamstrings 5/5 5/5   Gastroc/soleus 5/5 5/5   EHL 5/5 5/5   Interosseous 5/5 4/5    5/5 4/5   Wrist extension 5/5 5/5   Wrist flexion 5/5 5/5     Skin: No suspicious lesions or rashes. Incision appears to be healing well with no signs of infection or dehiscence.     Neurologic: Gait non-antalgic without assistive devices.     Psychiatric: Mentation appears normal and affect normal.    IMAGING: Updated AP and lateral EOS xrays were obtained today.  They show continued stable alignment.  Instrumentation is intact without evidence of loosening or migration. See full radiologic report in  chart.    CLINICAL ASSESSMENT:   1. S/P extension of spinal fusion to T1 on 2/6/17  2. C8-T1 weakness on the right    DISCUSSION/PLAN:   Julisa is a 66 year old female who returned clinic today for her second post operative appointment following extension of her spinal fusion to T1, which was performed on 2/6/17 by Dr. Wolfe.  She is progressing very well.  Updated radiographs were obtained today, and they show stable alignment with intact instrumentation.  On physical exam, she has some weakness in the right C8 and T1 nerve distributions.  We discussed a course of occupational therapy, and a referral was provided today.  She can now resume pool exercises.  She should return to clinic again in three months for further assessment and repeat radiographs.    All questions and concerns were answered to the patient's apparent satisfaction before leaving the clinic.     Thank you for allowing Dr. Wolfe and I to participate in the care of Julisa Champion.    Respectfully,  Madison Julian PA-C      Again, thank you for allowing me to participate in the care of your patient.      Sincerely,    Isauro Wolfe MD        Copy to patient    [address] [place] [address]

## 2017-04-20 NOTE — MR AVS SNAPSHOT
After Visit Summary   4/20/2017    Julisa Champion    MRN: [id number]           Patient Information     Date Of Birth          1950        Visit Information        Provider Department      4/20/2017 10:30 AM Isauro Wolfe MD [facility]         Follow-ups after your visit        Your next 10 appointments already scheduled     Jul 27, 2017 10:45 AM CDT   (Arrive by 10:15 AM)   RETURN SPINE with Isauro Wolfe MD   [facility] ([facility] [place])    [address] [place] [id number] [phone]              Who to contact     Please call your clinic at [phone] to:    Ask questions about your health    Make or cancel appointments    Discuss your medicines    Learn about your test results    Speak to your doctor   If you have compliments or concerns about an experience at your clinic, or if you wish to file a complaint, please contact [facility] Patient Relations at [phone] or email us at [email or web address]         Additional Information About Your Visit        MyChart Information     [employer] gives you secure access to your electronic health record. If you see a primary care provider, you can also send messages to your care team and make appointments. If you have questions, please call your primary care clinic.  If you do not have a primary care provider, please call [phone] and they will assist you.      [employer] is an electronic gateway that provides easy, online access to your medical records. With [employer], you can request a clinic appointment, read your test results, renew a prescription or communicate with your care team.     To access your existing account, please contact your [facility] or call [phone] for assistance.        Care EveryWhere ID     This is your Care EveryWhere ID. This could be used by other  "organizations to access your [place] medical records  [id number]        Your Vitals Were     Height BMI (Body Mass Index)                1.67 m (5' 5.75\") 38.3 kg/m2           Blood Pressure from Last 3 Encounters:   02/10/17 137/61   01/13/17 122/74   08/27/16 142/69    Weight from Last 3 Encounters:   04/20/17 106.8 kg (235 lb 8 oz)   03/23/17 107.7 kg (237 lb 6.4 oz)   02/09/17 117.9 kg (260 lb)              Today, you had the following     No orders found for display       Primary Care Provider    Md Other Clinic                Thank you!     Thank you for choosing [facility]  for your care. Our goal is always to provide you with excellent care. Hearing back from our patients is one way we can continue to improve our services. Please take a few minutes to complete the written survey that you may receive in the mail after your visit with us. Thank you!             Your Updated Medication List - Protect others around you: Learn how to safely use, store and throw away your medicines at www.disposemymeds.org.          This list is accurate as of: 4/20/17 11:05 AM.  Always use your most recent med list.                   Brand Name Dispense Instructions for use    acetaminophen 500 MG tablet    TYLENOL     Take 1,000 mg by mouth every 6 hours as needed for mild pain or fever Reported on 4/20/2017       diazepam 5 MG tablet    VALIUM    20 tablet    Take 1 tablet (5 mg) by mouth every 6 hours as needed for anxiety or other (muscle spasms)       EFFEXOR PO      Take 225 mg by mouth every evening       gabapentin 100 MG capsule    NEURONTIN    90 capsule    Take 1 capsule (100 mg) by mouth 3 times daily       nortriptyline 10 MG capsule    PAMELOR    30 capsule    Take 1 capsule (10 mg) by mouth At Bedtime       oxyCODONE 5 MG IR tablet    ROXICODONE    100 tablet    Take 1-2 tablets (5-10 mg) by mouth every 4 hours as needed for moderate to severe pain       polyethylene glycol Packet    " MIRALAX/GLYCOLAX    30 packet    Take 17 g by mouth daily as needed for constipation Reported on 4/20/2017       senna-docusate 8.6-50 MG per tablet    SENOKOT-S;PERICOLACE    60 tablet    Take 1-2 tablets by mouth daily as needed for constipation

## 2017-07-22 ASSESSMENT — ENCOUNTER SYMPTOMS
SEIZURES: 0
WEAKNESS: 1
STIFFNESS: 0
NUMBNESS: 1
HEADACHES: 0
PARALYSIS: 0
JOINT SWELLING: 0
ARTHRALGIAS: 0
MEMORY LOSS: 0
MUSCLE WEAKNESS: 1
DIZZINESS: 1
MUSCLE CRAMPS: 0
LOSS OF CONSCIOUSNESS: 0
BACK PAIN: 1
DISTURBANCES IN COORDINATION: 1
NECK PAIN: 1
TREMORS: 0
SPEECH CHANGE: 0
MYALGIAS: 0
TINGLING: 1

## 2017-07-24 DIAGNOSIS — Z98.1 S/P SPINAL FUSION: Primary | ICD-10-CM

## 2017-07-27 ENCOUNTER — OFFICE VISIT (OUTPATIENT)
Dept: ORTHOPEDICS | Facility: CLINIC | Age: 67
End: 2017-07-27

## 2017-07-27 VITALS — BODY MASS INDEX: 40.98 KG/M2 | HEIGHT: 65 IN | WEIGHT: 246 LBS

## 2017-07-27 DIAGNOSIS — G89.18 POSTOPERATIVE PAIN: Primary | ICD-10-CM

## 2017-07-27 DIAGNOSIS — M62.830 BACK MUSCLE SPASM: Primary | ICD-10-CM

## 2017-07-27 RX ORDER — OXYCODONE AND ACETAMINOPHEN 5; 325 MG/1; MG/1
TABLET ORAL
Qty: 30 TABLET | Refills: 0 | Status: SHIPPED | OUTPATIENT
Start: 2017-07-27

## 2017-07-27 RX ORDER — OXYCODONE AND ACETAMINOPHEN 5; 325 MG/1; MG/1
1 TABLET ORAL EVERY 6 HOURS PRN
Qty: 30 TABLET | Refills: 0 | Status: SHIPPED | OUTPATIENT
Start: 2017-07-27 | End: 2017-07-27

## 2017-07-27 NOTE — MR AVS SNAPSHOT
After Visit Summary   7/27/2017    Julisa Champion    MRN: [id number]           Patient Information     Date Of Birth          1950        Visit Information        Provider Department      7/27/2017 10:45 AM Isauro Wolfe MD [facility]        Today's Diagnoses     Back muscle spasm    -  1       Follow-ups after your visit        Additional Services     PHYSICAL THERAPY REFERRAL (External-Prints)       Physical Therapy Referral                  Who to contact     Please call your clinic at [phone] to:    Ask questions about your health    Make or cancel appointments    Discuss your medicines    Learn about your test results    Speak to your doctor   If you have compliments or concerns about an experience at your clinic, or if you wish to file a complaint, please contact [facility] Patient Relations at [phone] or email us at [email or web address]         Additional Information About Your Visit        MyChart Information     [employer] gives you secure access to your electronic health record. If you see a primary care provider, you can also send messages to your care team and make appointments. If you have questions, please call your primary care clinic.  If you do not have a primary care provider, please call [phone] and they will assist you.      [employer] is an electronic gateway that provides easy, online access to your medical records. With [employer], you can request a clinic appointment, read your test results, renew a prescription or communicate with your care team.     To access your existing account, please contact your [facility] or call [phone] for assistance.        Care EveryWhere ID     This is your Care EveryWhere ID. This could be used by other organizations to access your [place] medical records  [id number]        Your Vitals Were     Height BMI (Body Mass Index)              "   1.66 m (5' 5.35\") 40.49 kg/m2           Blood Pressure from Last 3 Encounters:   02/10/17 137/61   01/13/17 122/74   08/27/16 142/69    Weight from Last 3 Encounters:   07/27/17 111.6 kg (246 lb)   04/20/17 106.8 kg (235 lb 8 oz)   03/23/17 107.7 kg (237 lb 6.4 oz)              We Performed the Following     PHYSICAL THERAPY REFERRAL (External-Prints)          Today's Medication Changes          These changes are accurate as of: 7/27/17 11:59 PM.  If you have any questions, ask your nurse or doctor.               Start taking these medicines.        Dose/Directions    oxyCODONE-acetaminophen 5-325 MG per tablet   Commonly known as:  PERCOCET   Used for:  Postoperative pain   Started by:  Isauro Wolfe MD        Take 1 tab every 6 hours as needed for pain   Quantity:  30 tablet   Refills:  0         Stop taking these medicines if you haven't already. Please contact your care team if you have questions.     diazepam 5 MG tablet   Commonly known as:  VALIUM   Stopped by:  Isauro Wolfe MD           gabapentin 100 MG capsule   Commonly known as:  NEURONTIN   Stopped by:  Isauro Wolfe MD           nortriptyline 10 MG capsule   Commonly known as:  PAMELOR   Stopped by:  Isauro Wolfe MD           oxyCODONE 5 MG IR tablet   Commonly known as:  ROXICODONE   Stopped by:  Isauro Wolfe MD           polyethylene glycol Packet   Commonly known as:  MIRALAX/GLYCOLAX   Stopped by:  Isauro Wolfe MD                Where to get your medicines      Some of these will need a paper prescription and others can be bought over the counter.  Ask your nurse if you have questions.     Bring a paper prescription for each of these medications     oxyCODONE-acetaminophen 5-325 MG per tablet                Primary Care Provider    Md Other Clinic                Equal Access to Services     JOVANNY BAHENA AH: essie Jarquin, bravo zazueta, vesta bertrand " enoch chavezaarosie ah. So [facility] [phone].    ATENCIÓN: Si gm ruggiero, tiene a vazquez disposición servicios gratuitos de asistencia lingüística. Marissa al [phone].    We comply with applicable federal civil rights laws and [place] laws. We do not discriminate on the basis of race, color, national origin, age, disability sex, sexual orientation or gender identity.            Thank you!     Thank you for choosing [facility]  for your care. Our goal is always to provide you with excellent care. Hearing back from our patients is one way we can continue to improve our services. Please take a few minutes to complete the written survey that you may receive in the mail after your visit with us. Thank you!             Your Updated Medication List - Protect others around you: Learn how to safely use, store and throw away your medicines at www.disposemymeds.org.          This list is accurate as of: 7/27/17 11:59 PM.  Always use your most recent med list.                   Brand Name Dispense Instructions for use Diagnosis    acetaminophen 500 MG tablet    TYLENOL     Take 1,000 mg by mouth every 6 hours as needed for mild pain or fever Reported on 4/20/2017        ALEVE PO      Take 2 tablets by mouth 2 times daily (with meals)        EFFEXOR PO      Take 150 mg by mouth every evening        oxyCODONE-acetaminophen 5-325 MG per tablet    PERCOCET    30 tablet    Take 1 tab every 6 hours as needed for pain    Postoperative pain       senna-docusate 8.6-50 MG per tablet    SENOKOT-S;PERICOLACE    60 tablet    Take 1-2 tablets by mouth daily as needed for constipation    S/P spinal fusion

## 2017-07-27 NOTE — LETTER
7/27/2017       RE: Julisa Champion  [address] [place] [address]     Dear Colleague,    Thank you for referring your patient, Julisa Champion, to the [facility] at [facility]. Please see a copy of my visit note below.    Follow-up VIsit    HISTORY OF PRESENT ILLNESS: Julisa Champion is a 67 year old female here for 6 month follow-up s/p extension of posterior spinal fusion to T1 2/6/17. She has been having increasing pain in last couple months that is similar to pain patient had before surgery. Pain is to the right of cervical spine and radiates into the neck. Pain occurs all the time and is worse with movement. Intensity of pain waxes and wanes throughout the day. She is taking tylenol and aleve for pain and has tried acupuncture. Has not had PT since surgery. Also complains of numbness in the R arm in the ulnar distribution. Pain starts at the elbow and radiates into the ulnar 2 digits.     SILVA: 38  VAS: 7-7.5  [id number]: 21    PHYSICAL EXAM:     Spine:   Stands erect with no list or stoop   Overall good sagittal alignment   No scoliotic curves grossly evident         --------------------------------   Sensation intact to light touch in C5-C8.   Tenderness to palpation of paraspinal muscles on R of C-spine.   C3-5: Easy diaphragmatic respirations without utilization of accessory muscles    C6: Biceps R and L 5/5 strength    C7: Triceps R and L 5/5 strength    C8:  R and L 5/5 strength    T1: Dorsal interossei R and L 5/5 strength            Reflexes: Biceps reflex 2+ b/l    IMAGING: AP/lat EOS images reviewed - stable alignment. Instrumentation intact without evidence of loosening or migration.    CLINICAL ASSESSMENT:   1. S/p extension of spinal fusion to T1 2/6/17  2. Muscle spasms in cervical spine region    DISCUSSION/PLAN:   This is Julisa's third post-op visit since extension of spinal fusion to T1 2/6/17 with Dr. Wolfe. Increased  pain in soft tissue to R of cervical spine since last clinic visit 4/20. Ms. Champion would benefit from PT for modality work for trapezial muscle spasms, paraspinal muscle strengthening, and ROM. She also requested something for pain, so we are giving her a prescription of 5-325 mg Percocet x30 tabs - no refills. We will see if this helps with patient's pain as her discomfort appears to be from soft tissue. Will plan to have patient f/u at 1 year post-op.     Patient was seen and discussed with Dr. Wolfe.  Assessment and plan developed by him.  All questions and concerns were answered to the patient's apparent satisfaction before leaving the clinic.     Opal Chin MD  N Resident - PGY1  Orthopedic Surgery    I saw and evaluated the patient on the day of the visit and I formulated the plan.  Isauro Wolfe MD

## 2017-07-27 NOTE — NURSING NOTE
"Reason For Visit:   Chief Complaint   Patient presents with     RECHECK     s/p extension of fusion 2/6/17       Primary MD: Dr. Jarrett Sanchez  Ref. MD: Dr. Laura Agee        Occupation retired RN.  Date of injury: none    Date of surgery: 2/6/17  Type of surgery: PROCEDURES:  Extension of fusion to T1, pedicle screw instrumentation T1, decompression bilaterally T1-T2, Bautista Sanchez osteotomy T1-T2, placement intervertebral device T1-T2, image-guided surgery, posterior spinal fusion T1-T2. .  Smoker: No      Ht 1.66 m (5' 5.35\")  Wt 111.6 kg (246 lb)  BMI 40.49 kg/m2    Pain Assessment  Patient Currently in Pain: Yes  0-10 Pain Scale: 8  Primary Pain Location: Back  Pain Orientation: Right, Upper  Pain Descriptors: Sharp, Burning  Alleviating Factors: Other (comment) (nothing)  Aggravating Factors: Other (comment) (does not depend on activity or position)    Oswestry (SILVA) Questionnaire    OSWESTRY DISABILITY INDEX 7/22/2017   SECTION 1-PAIN INTENSITY The pain is fairly severe at the moment.   SECTION 2-PERSONAL CARE (WASHING,DRESSING,ETC.) I can look after myself normally but it is very painful.   SECTION 3-LIFTING I can only lift very light weights.   SECTION 4-WALKING Pain prevents me from walking more than a quarter of a mile.   SECTION 5-SITTING I can sit in my favorite chair as long as I like.   SECTION 6-STANDING I can stand as long as I want but it gives me additional pain.   SECTION 7-SLEEPING Because of pain I have less than 6 hours sleep.   SECTION 8-SEX LIFE (IF APPLICABLE) Not answered/NA   SECTION 9-SOCIAL LIFE My social life is normal but increases the degree of pain.   SECTION 10-TRAVELING Pain is bad but I am able to manage trips over two hours.   Oswestry Disability Index: Count 9   SILVA: Total Score = SUM (total for answered questions) 17   Computed Oswestry Score 37.77 (%)   Some recent data might be hidden                      Numeric Rating Scale:  VAS Scores     VAS Survey 7/22/2017 "   What is your level of back pain during the last week: 7.5   What is your level of RIGHT leg pain during the last week: 0   What is your level of LEFT leg pain during the last week: 0   What is your level of neck pain during the last week: 7   What is your level of RIGHT arm pain during the last week: 0   What is your level of LEFT arm pain during the last week: 0                Promis 10 Assessment    PROMIS 10 7/22/2017   In general, would you say your health is: Good   In general, would you say your quality of life is: Good   In general, how would you rate your physical health? Fair   In general, how would you rate your mental health, including your mood and your ability to think? Good   In general, how would you rate your satisfaction with your social activities and relationships? Good   In general, please rate how well you carry out your usual social activities and roles Good   To what extent are you able to carry out your everyday physical activities such as walking, climbing stairs, carrying groceries, or moving a chair? A little   How often have you been bothered by emotional problems such as feeling anxious, depressed or irritable? Sometimes   How would you rate your fatigue on average? Moderate   How would you rate your pain on average?   0 = No Pain  to  10 = Worst Imaginable Pain 7   Global Physical Health Score : Raw Score 9 (SUM : G03 - G06 - G07 - G08)   Global Mentall Health Score : Raw Score 12 (SUM : G02 - G04 - G05 - G10)   Total (Physical + Mental Health Score) 21   In general, would you say your health is: 3   In general, would you say your quality of life is: 3   In general, how would you rate your physical health? 2   In general, how would you rate your mental health, including your mood and your ability to think? 3   In general, how would you rate your satisfaction with your social activities and relationships? 3   In general, please rate how well you carry out your usual social activities  and roles. (This includes activities at home, at work and in your community, and responsibilities as a parent, child, spouse, employee, friend, etc.) 3   To what extent are you able to carry out your everyday physical activities such as walking, climbing stairs, carrying groceries, or moving a chair? 2   In the past 7 days, how often have you been bothered by emotional problems such as feeling anxious, depressed, or irritable? 3   In the past 7 days, how would you rate your fatigue on average? 3   In the past 7 days, how would you rate your pain on average, where 0 means no pain, and 10 means worst imaginable pain? 7   Global Mental Health Score 12   Global Physical Health Score 9   PROMIS TOTAL - SUBSCORES 21   Pain question re-calculation - no clinical value 2

## 2020-03-10 ENCOUNTER — HEALTH MAINTENANCE LETTER (OUTPATIENT)
Age: 70
End: 2020-03-10

## 2020-12-27 ENCOUNTER — HEALTH MAINTENANCE LETTER (OUTPATIENT)
Age: 70
End: 2020-12-27

## 2021-03-03 ENCOUNTER — TELEPHONE (OUTPATIENT)
Dept: ORTHOPEDICS | Facility: CLINIC | Age: 71
End: 2021-03-03

## 2021-03-03 NOTE — TELEPHONE ENCOUNTER
Writer called and talked with patient on the phone. Writer stated that pt has not followed up from surgery since 2017.Writer asked if pt would be willing to schedule a follow up apt and update imaging. Pt states that they do not like coming to [place]. Writer will confirm with Dr. Wolfe to see if pt can be seen virtually. Writer will call back after confirming with Dr. oWlfe.     Rachel Westbrook LPN

## 2021-03-22 ENCOUNTER — TELEPHONE (OUTPATIENT)
Dept: ORTHOPEDICS | Facility: CLINIC | Age: 71
End: 2021-03-22

## 2021-03-22 NOTE — TELEPHONE ENCOUNTER
Writer called and left pt a voice message to call clinic back to set up a return visit as it has been a while seen seen. Pt can schedule a virtual visit. Writer also asked that pt leave a detailed message of where would want imaging orders sent.     Rachel Westbrook LPN

## 2021-04-24 ENCOUNTER — HEALTH MAINTENANCE LETTER (OUTPATIENT)
Age: 71
End: 2021-04-24

## 2021-10-09 ENCOUNTER — HEALTH MAINTENANCE LETTER (OUTPATIENT)
Age: 71
End: 2021-10-09

## 2022-03-20 ENCOUNTER — HEALTH MAINTENANCE LETTER (OUTPATIENT)
Age: 72
End: 2022-03-20

## 2022-05-16 ENCOUNTER — HEALTH MAINTENANCE LETTER (OUTPATIENT)
Age: 72
End: 2022-05-16

## 2022-09-11 ENCOUNTER — HEALTH MAINTENANCE LETTER (OUTPATIENT)
Age: 72
End: 2022-09-11

## 2023-02-24 NOTE — PROGRESS NOTES
Contacted patient for f/u post hospitalization for facial droop  She is receiving home health services of SN, PT/OT  L eye and mouth droop present  She feels her L eye vision is blurry and she feels pressure behind her eye  Plans to call ophthalmology for appointment  She experiences facial numbness and tingling that comes and goes  She has L sided weakness that runs from shoulder to foot  She c/o headaches and is taking fiorcet with relief  Reviewed f/u appointments  She has all scheduled except cardiology  No transportation issues, she has [employer]  No changes to her medications, she takes all as prescribed  Nutritional intake adequate  She denies needing help at home, managing without issues  Provided my contact information for any questions or concerns  No needs at this time  Follow-up VIsit    HISTORY OF PRESENT ILLNESS: Julisa Champion is a 67 year old female here for 6 month follow-up s/p extension of posterior spinal fusion to T1 2/6/17. She has been having increasing pain in last couple months that is similar to pain patient had before surgery. Pain is to the right of cervical spine and radiates into the neck. Pain occurs all the time and is worse with movement. Intensity of pain waxes and wanes throughout the day. She is taking tylenol and aleve for pain and has tried acupuncture. Has not had PT since surgery. Also complains of numbness in the R arm in the ulnar distribution. Pain starts at the elbow and radiates into the ulnar 2 digits.     SILVA: 38  VAS: 7-7.5  [id number]: 21      PHYSICAL EXAM:       Spine:   Stands erect with no list or stoop   Overall good sagittal alignment   No scoliotic curves grossly evident         --------------------------------   Sensation intact to light touch in C5-C8.   Tenderness to palpation of paraspinal muscles on R of C-spine.   C3-5: Easy diaphragmatic respirations without utilization of accessory muscles    C6: Biceps R and L 5/5 strength    C7: Triceps R and L 5/5 strength    C8:  R and L 5/5 strength    T1: Dorsal interossei R and L 5/5 strength            Reflexes: Biceps reflex 2+ b/l      IMAGING: AP/lat EOS images reviewed - stable alignment. Instrumentation intact without evidence of loosening or migration.      CLINICAL ASSESSMENT:   1. S/p extension of spinal fusion to T1 2/6/17  2. Muscle spasms in cervical spine region    DISCUSSION/PLAN:   This is Julisa's third post-op visit since extension of spinal fusion to T1 2/6/17 with Dr. Wolfe. Increased pain in soft tissue to R of cervical spine since last clinic visit 4/20. Ms. Champion would benefit from PT for modality work for trapezial muscle spasms, paraspinal muscle strengthening, and ROM. She also requested something for pain, so we are giving her a prescription of 5-325  mg Percocet x30 tabs - no refills. We will see if this helps with patient's pain as her discomfort appears to be from soft tissue. Will plan to have patient f/u at 1 year post-op.     Patient was seen and discussed with Dr. Wolfe.  Assessment and plan developed by him.  All questions and concerns were answered to the patient's apparent satisfaction before leaving the clinic.     Opal Chin MD  [facility] Resident - PGY1  Orthopedic Surgery    I saw and evaluated the patient on the day of the visit and I formulated the plan.  Isauro Wolfe MD      Answers for HPI/ROS submitted by the patient on 7/22/2017   General Symptoms: No  Skin Symptoms: No  HENT Symptoms: No  EYE SYMPTOMS: No  HEART SYMPTOMS: No  LUNG SYMPTOMS: No  INTESTINAL SYMPTOMS: No  URINARY SYMPTOMS: No  GYNECOLOGIC SYMPTOMS: No  BREAST SYMPTOMS: No  SKELETAL SYMPTOMS: Yes  BLOOD SYMPTOMS: No  NERVOUS SYSTEM SYMPTOMS: Yes  MENTAL HEALTH SYMPTOMS: No  Back pain: Yes  Muscle aches: No  Neck pain: Yes  Swollen joints: No  Joint pain: No  Bone pain: No  Muscle cramps: No  Muscle weakness: Yes  Joint stiffness: No  Bone fracture: No  Trouble with coordination: Yes  Dizziness or trouble with balance: Yes  Fainting or black-out spells: No  Memory loss: No  Headache: No  Seizures: No  Speech problems: No  Tingling: Yes  Tremor: No  Weakness: Yes  Difficulty walking: Yes  Paralysis: No  Numbness: Yes

## 2023-06-03 ENCOUNTER — HEALTH MAINTENANCE LETTER (OUTPATIENT)
Age: 73
End: 2023-06-03

## (undated) DEVICE — Device

## (undated) DEVICE — STPL SKIN 35W 059037

## (undated) DEVICE — CELL SAVER

## (undated) DEVICE — DRSG AQUACEL AG 3.5X9.75" HYDROFIBER 412011

## (undated) DEVICE — SOL WATER IRRIG 1000ML BOTTLE 2F7114

## (undated) DEVICE — SU VICRYL 1 CT-1 CR 8X18" J741D

## (undated) DEVICE — SUCTION TIP YANKAUER STR K87

## (undated) DEVICE — SYR 10ML FINGER CONTROL W/O NDL 309695

## (undated) DEVICE — IOM SUPPLY

## (undated) DEVICE — PREP DURAPREP REMOVER 4OZ 8611

## (undated) DEVICE — SPONGE SURGIFOAM 100 1974

## (undated) DEVICE — BASIN SET MAJOR

## (undated) DEVICE — TAPE MEDIPORE 6"X2YD 2866

## (undated) DEVICE — GLOVE PROTEXIS W/NEU-THERA 8.0  2D73TE80

## (undated) DEVICE — GOWN IMPERVIOUS ZONED XLG 9041

## (undated) DEVICE — ESU BIPOLAR SEALER AQUAMANTYS 6MM 23-112-1

## (undated) DEVICE — DRSG DRAIN 4X4" 7086

## (undated) DEVICE — DRSG STERI STRIP 1/2X4" R1547

## (undated) DEVICE — DRAPE STERI TOWEL LG 1010

## (undated) DEVICE — LINEN BACK PACK 5440

## (undated) DEVICE — SU MONOCRYL 3-0 PS-2 18" UND Y497G

## (undated) DEVICE — LINEN STRADDLE PACK

## (undated) DEVICE — TAPE DURAPORE 3" SILK 1538-3

## (undated) DEVICE — GLOVE PROTEXIS POWDER FREE 8.0 ORTHOPEDIC 2D73ET80

## (undated) DEVICE — LIGHT HANDLE X1 31140133

## (undated) DEVICE — LINEN TOWEL PACK X5 5464

## (undated) DEVICE — IMM COLLAR CERVICAL MED UNIVERSAL 2.5X24" 79-83520

## (undated) DEVICE — PREP DURAPREP 26ML APL 8630

## (undated) DEVICE — SU VICRYL 1 CT-1 36" J347H

## (undated) DEVICE — DRAPE O ARM TUBE 9732722

## (undated) DEVICE — DRSG TEGADERM 4X4 3/4" 1626W

## (undated) DEVICE — ESU GROUND PAD UNIVERSAL W/O CORD

## (undated) DEVICE — BLADE KNIFE SURG 11 371111

## (undated) DEVICE — MOUSE O ARM 9732721

## (undated) DEVICE — LINEN GOWN X4 5410

## (undated) DEVICE — DRAPE POUCH INSTRUMENT 1018

## (undated) DEVICE — WIPES FOLEY CARE SURESTEP PROVON DFC100

## (undated) DEVICE — CATH TRAY FOLEY SURESTEP 16FR WDRAIN BAG STLK LATEX A300316A

## (undated) DEVICE — SURGICEL HEMOSTAT 4X8" 1952

## (undated) DEVICE — MIDAS REX DIFFUSER LUBRICANT LEGEND PA100-A

## (undated) DEVICE — MARKER SPHERZ PACK 5

## (undated) DEVICE — SOL ADH LIQUID BENZOIN SWAB 0.6ML C1544

## (undated) DEVICE — MIDAS REX DISSECTING TOOL  14MH30

## (undated) DEVICE — SPONGE COTTONOID 3/4X3/4" 80-1401

## (undated) DEVICE — PAD ARMBOARD FOAM EGGCRATE COVIDEN 3114367

## (undated) DEVICE — DRAPE IOBAN INCISE 23X17" 6650EZ

## (undated) DEVICE — DRAIN HEMOVAC RESERVOIR KIT 10FR 1/8" MED 00-2550-002-10

## (undated) DEVICE — GLOVE PROTEXIS POWDER FREE 8.5 ORTHOPEDIC 2D73ET85

## (undated) DEVICE — RX SURGIFLO HEMOSTATIC MATRIX W/THROMBIN 8ML NEXTGEN 2993

## (undated) DEVICE — SPECIMEN CONTAINER 5OZ STERILE 2600SA

## (undated) DEVICE — SU VICRYL 2-0 CT-2 27" UND J269H

## (undated) RX ORDER — FENTANYL CITRATE 50 UG/ML
INJECTION, SOLUTION INTRAMUSCULAR; INTRAVENOUS
Status: DISPENSED
Start: 2017-02-06

## (undated) RX ORDER — CEFAZOLIN SODIUM 2 G/100ML
INJECTION, SOLUTION INTRAVENOUS
Status: DISPENSED
Start: 2017-02-06